# Patient Record
Sex: FEMALE | Race: WHITE | Employment: UNEMPLOYED | URBAN - NONMETROPOLITAN AREA
[De-identification: names, ages, dates, MRNs, and addresses within clinical notes are randomized per-mention and may not be internally consistent; named-entity substitution may affect disease eponyms.]

---

## 2022-10-25 ENCOUNTER — TELEPHONE (OUTPATIENT)
Dept: NEUROLOGY | Age: 52
End: 2022-10-25

## 2022-10-27 ENCOUNTER — TELEPHONE (OUTPATIENT)
Dept: NEUROLOGY | Age: 52
End: 2022-10-27

## 2022-10-27 NOTE — TELEPHONE ENCOUNTER
Received a referral for this patient. Called and spoke with patient to let her know when I was able to get her scheduled an appointment with LUI Caba. Patient is aware of the appointment time/date/location. I will also mailed patient an appointment letter.

## 2022-11-01 ENCOUNTER — TELEPHONE (OUTPATIENT)
Dept: CARDIOLOGY CLINIC | Age: 52
End: 2022-11-01

## 2022-11-01 RX ORDER — ONDANSETRON 4 MG/1
4 TABLET, FILM COATED ORAL EVERY 8 HOURS PRN
COMMUNITY

## 2022-11-01 RX ORDER — LOSARTAN POTASSIUM 100 MG/1
100 TABLET ORAL DAILY
COMMUNITY

## 2022-11-01 RX ORDER — PROPRANOLOL HYDROCHLORIDE 20 MG/1
20 TABLET ORAL 2 TIMES DAILY PRN
COMMUNITY

## 2022-11-02 ENCOUNTER — OFFICE VISIT (OUTPATIENT)
Dept: CARDIOLOGY CLINIC | Age: 52
End: 2022-11-02
Payer: MEDICAID

## 2022-11-02 VITALS
HEART RATE: 53 BPM | HEIGHT: 67 IN | DIASTOLIC BLOOD PRESSURE: 78 MMHG | SYSTOLIC BLOOD PRESSURE: 132 MMHG | WEIGHT: 251 LBS | BODY MASS INDEX: 39.39 KG/M2

## 2022-11-02 DIAGNOSIS — I10 PRIMARY HYPERTENSION: ICD-10-CM

## 2022-11-02 DIAGNOSIS — R01.1 HEART MURMUR: Primary | ICD-10-CM

## 2022-11-02 DIAGNOSIS — G43.909 MIGRAINE WITHOUT STATUS MIGRAINOSUS, NOT INTRACTABLE, UNSPECIFIED MIGRAINE TYPE: ICD-10-CM

## 2022-11-02 DIAGNOSIS — R09.89 BILATERAL CAROTID BRUITS: ICD-10-CM

## 2022-11-02 DIAGNOSIS — R73.03 PREDIABETES: ICD-10-CM

## 2022-11-02 DIAGNOSIS — E66.9 OBESITY (BMI 35.0-39.9 WITHOUT COMORBIDITY): ICD-10-CM

## 2022-11-02 PROCEDURE — 3075F SYST BP GE 130 - 139MM HG: CPT | Performed by: CLINICAL NURSE SPECIALIST

## 2022-11-02 PROCEDURE — 99204 OFFICE O/P NEW MOD 45 MIN: CPT | Performed by: CLINICAL NURSE SPECIALIST

## 2022-11-02 PROCEDURE — 3078F DIAST BP <80 MM HG: CPT | Performed by: CLINICAL NURSE SPECIALIST

## 2022-11-02 PROCEDURE — 93000 ELECTROCARDIOGRAM COMPLETE: CPT | Performed by: CLINICAL NURSE SPECIALIST

## 2022-11-02 ASSESSMENT — ENCOUNTER SYMPTOMS
NAUSEA: 0
FACIAL SWELLING: 0
COUGH: 0
SHORTNESS OF BREATH: 0
VOMITING: 0
CHEST TIGHTNESS: 0
EYE REDNESS: 0
ABDOMINAL PAIN: 0
WHEEZING: 0

## 2022-11-02 NOTE — PROGRESS NOTES
[facility], [address] [facility] [address], [address] [address]  Phone: [phone]  Fax: [phone]    OFFICE VISIT:  2022    Edis Martinez - : 1970    Reason For Visit:  Mya Palmer is a 50 y.o. female who is here for New Patient (Pres. [address] [facility]. ) and Heart Murmur       Diagnosis Orders   1. Heart murmur  EKG 12 lead      2. Bilateral carotid bruits  VL DUP CAROTID BILATERAL      3. Primary hypertension        4. Obesity (BMI 35.0-39.9 without comorbidity)        5. Prediabetes        6. Migraine without status migrainosus, not intractable, unspecified migraine type            HPI  Patient has recently relocated here from [place] to live closer to family and is originally from the [place] area. She is here to establish care. Previously seen by a cardiologist in [place] and told she had a heart murmur. We have no records from her previous cardiology office. She reports an echocardiogram was done earlier this year. From patient's description, it sounds like she possibly has a bicuspid valve, but could not remember if this was the aortic valve for which valve was involved. She states she was told that just needed monitoring presently. Patient was hospitalized in [place] last year for severe migraines, hypertension. She developed strokelike symptoms with right-sided weakness and aphasia. She states that stroke was \"not detectable. \"  But, she has persistent right-sided weakness. She has been plagued with daily migraines since this time and now walks with a cane. She will be seeing neurology in a couple of weeks    Other history includes hypertension, smoking, prediabetes. She has never had a stress test to her knowledge. There is no family history of heart disease in first-degree relatives. There is a history of atrial fibrillation and her sister.     She denies any cardiac symptoms such as chest pain, unusual dyspnea, orthopnea, PND, edema. She has some rare, brief palpitations lasting only seconds       LUI Toscano CNP is PCP. Carly Metz has the following history as recorded in [facility]:    Patient Active Problem List    Diagnosis Date Noted    Heart murmur 2022    Bilateral carotid bruits 2022    Primary hypertension 2022    Obesity (BMI 35.0-39.9 without comorbidity) 2022    Prediabetes 2022    Migraine without status migrainosus, not intractable 2022       Past Medical History:   Diagnosis Date    Anxiety     Heart murmur      Past Surgical History:   Procedure Laterality Date     SECTION      ECTOPIC PREGNANCY SURGERY      GALLBLADDER SURGERY       Family History   Problem Relation Age of Onset    Hypertension Mother     Stroke Mother     Diabetes Father     Atrial Fibrillation Sister      Social History     Tobacco Use    Smoking status: Every Day     Packs/day: 1.00     Types: Cigarettes     Passive exposure: Current    Smokeless tobacco: Never   Substance Use Topics    Alcohol use: Yes     Comment: rarely      Current Outpatient Medications   Medication Sig Dispense Refill    ondansetron (ZOFRAN) 4 MG tablet Take 4 mg by mouth every 8 hours as needed for Nausea or Vomiting      sertraline (ZOLOFT) 50 MG tablet Take 50 mg by mouth daily      losartan (COZAAR) 100 MG tablet Take 100 mg by mouth daily      propranolol (INDERAL) 20 MG tablet Take 20 mg by mouth 2 times daily as needed       No current facility-administered medications for this visit. Allergies: Penicillins    Review of Systems  Review of Systems   Constitutional:  Positive for fatigue. Negative for activity change, diaphoresis, fever and unexpected weight change. HENT:  Negative for facial swelling and nosebleeds. Eyes:  Negative for redness and visual disturbance. Respiratory:  Negative for cough, chest tightness, shortness of breath and wheezing.     Cardiovascular: Negative for chest pain, palpitations and leg swelling. Gastrointestinal:  Negative for abdominal pain, nausea and vomiting. Endocrine: Negative for cold intolerance and heat intolerance. Genitourinary:  Negative for dysuria and hematuria. Musculoskeletal:  Negative for arthralgias and myalgias. Skin:  Negative for pallor and rash. Neurological:  Positive for headaches. Negative for dizziness, seizures, syncope, weakness and light-headedness. Right sided weakness   Hematological:  Does not bruise/bleed easily. Psychiatric/Behavioral:  Negative for agitation. The patient is not nervous/anxious. Objective  Vital Signs - /78   Pulse 53   Ht 5' 7\" (1.702 m)   Wt 251 lb (113.9 kg)   BMI 39.31 kg/m²   Physical Exam  Vitals and nursing note reviewed. Constitutional:       General: She is not in acute distress. Appearance: She is well-developed. She is not diaphoretic. HENT:      Head: Normocephalic and atraumatic. Right Ear: Hearing and external ear normal.      Left Ear: Hearing and external ear normal.      Nose: Nose normal.   Eyes:      General:         Right eye: No discharge. Left eye: No discharge. Pupils: Pupils are equal, round, and reactive to light. Neck:      Thyroid: No thyromegaly. Vascular: Carotid bruit (bilateral) present. No JVD. Trachea: No tracheal deviation. Cardiovascular:      Rate and Rhythm: Normal rate and regular rhythm. Heart sounds: Murmur (2/6 systolic aortic area, radiates to carotids) heard. No friction rub. No gallop. Pulmonary:      Effort: Pulmonary effort is normal. No respiratory distress. Breath sounds: Normal breath sounds. No wheezing or rales. Abdominal:      Palpations: Abdomen is soft. Tenderness: There is no abdominal tenderness. Musculoskeletal:         General: No swelling or deformity. Cervical back: Neck supple. No muscular tenderness. Right lower leg: No edema. Left lower leg: No edema. Comments: Ambulates with cane   Skin:     General: Skin is warm and dry. Findings: No rash. Neurological:      General: No focal deficit present. Mental Status: She is alert and oriented to person, place, and time. Cranial Nerves: No cranial nerve deficit. Psychiatric:         Mood and Affect: Mood normal.         Behavior: Behavior normal.         Judgment: Judgment normal.       Data:    Assessment:     Diagnosis Orders   1. Heart murmur  EKG 12 lead      2. Bilateral carotid bruits  VL DUP CAROTID BILATERAL      3. Primary hypertension        4. Obesity (BMI 35.0-39.9 without comorbidity)        5. Prediabetes        6. Migraine without status migrainosus, not intractable, unspecified migraine type          Heart murmur-with work-up in [place].  Will request records as an echo has been done earlier this year at that location. Patient does not remember specific things, but does remember discussion about a bicuspid valve that needed ongoing monitoring. Bilateral carotid bruits-we will check carotid ultrasound    Hypertension-previously uncontrolled, but stable today after recent increase in losartan    Obesity-encouraged weight loss and regular exercise    Prediabetes-presently stable and has not required diabetes medications thus far    Migraine-ongoing on a daily basis. Seeing neurology in the near future    Stable cardiovascular status. No evidence of overt heart failure,angina or dysrhythmia. Plan    Orders Placed This Encounter   Procedures    VL DUP CAROTID BILATERAL     Standing Status:   Future     Standing Expiration Date:   11/2/2023     Order Specific Question:   Reason for exam:     Answer:   carotid bruit    EKG 12 lead     Order Specific Question:   Reason for Exam?     Answer: Other     Return in about 1 month (around 12/2/2022) for Dr Dora Barraza. Quit smoking.   Call the [place] [facility] [place] records from [facility] [place], [facility] [address]. (Echo)  Carotid Ultrasound    Call with any questionsor concerns  Follow up with Ulysses Palau, APRN - CNP for non cardiac problems  Report any new problems  Cardiovascular Fitness-Exercise as tolerated. Strive for 15 minutes of exercise most days of the week. Cardiac / HealthyDiet  Continue current medications as directed  Continue plan of treatment  It is always recommended that you bring your medicationsbottles with you to each visit - this is for your safety!        LUI Munroe

## 2022-11-02 NOTE — PATIENT INSTRUCTIONS
Return in about 1 month (around 12/2/2022) for Dr Mike Hitchcock. Quit smoking. Call the [address] [place] [address] [phone]   Get records from [place]  Carotid Ultrasound    [place] at the [facility] located on the first floor of [place] [address] through hospital main entrance and turn immediately to your left. Patient's contact number:  [phone] (home)     Date/Time:     Carotid Ultrasound   -  No prep. Takes approximately 30 minutes. Carotid ultrasound is a safe, painless procedure that uses sound waves to examine the structure and function of the carotid arteries in the neck. You have two carotid arteries, one on each side of the neck, which deliver blood from the heart to the brain. Carotid ultrasound can reveal whether an artery has any blockage and how well blood flows through the artery. Carotid ultrasound is usually used to screen for blockages that indicate an increased risk of stroke. Results from a carotid ultrasound can help your doctor determine what kind of treatment you may need to lower your risk. If you need to change your appointment, please call outpatient scheduling at [phone].

## 2022-11-03 ENCOUNTER — TELEPHONE (OUTPATIENT)
Dept: CARDIOLOGY CLINIC | Age: 52
End: 2022-11-03

## 2022-11-03 NOTE — TELEPHONE ENCOUNTER
Reviewed patient records from [place].  Echo shows questionable bicuspid aortic valve with mild stenosis and moderate regurgitation done in February 2022. Carotid studies have already been done so therefore we will cancel carotid studies ordered for bilateral rotted bruits on exam.  She had mild plaquing previously.     Further recommendations per Dr. Yakov Richter at upcoming visit    Penny-please cancel patient's carotid studies as they have already been done

## 2022-11-16 ENCOUNTER — OFFICE VISIT (OUTPATIENT)
Dept: NEUROLOGY | Age: 52
End: 2022-11-16
Payer: MEDICAID

## 2022-11-16 VITALS
OXYGEN SATURATION: 97 % | DIASTOLIC BLOOD PRESSURE: 80 MMHG | SYSTOLIC BLOOD PRESSURE: 141 MMHG | HEART RATE: 61 BPM | BODY MASS INDEX: 39.34 KG/M2 | WEIGHT: 251.2 LBS

## 2022-11-16 DIAGNOSIS — R29.2 HOFFMAN SIGN PRESENT: ICD-10-CM

## 2022-11-16 DIAGNOSIS — R53.1 RIGHT SIDED WEAKNESS: ICD-10-CM

## 2022-11-16 DIAGNOSIS — R40.4 ALTERED CONSCIOUSNESS: ICD-10-CM

## 2022-11-16 DIAGNOSIS — G43.119 INTRACTABLE MIGRAINE WITH AURA WITHOUT STATUS MIGRAINOSUS: Primary | ICD-10-CM

## 2022-11-16 PROCEDURE — 99214 OFFICE O/P EST MOD 30 MIN: CPT | Performed by: NURSE PRACTITIONER

## 2022-11-16 PROCEDURE — 3075F SYST BP GE 130 - 139MM HG: CPT | Performed by: NURSE PRACTITIONER

## 2022-11-16 PROCEDURE — 3078F DIAST BP <80 MM HG: CPT | Performed by: NURSE PRACTITIONER

## 2022-11-16 RX ORDER — UBROGEPANT 100 MG/1
TABLET ORAL
Qty: 10 TABLET | Refills: 3 | Status: SHIPPED | OUTPATIENT
Start: 2022-11-16

## 2022-11-16 RX ORDER — SERTRALINE HYDROCHLORIDE 100 MG/1
TABLET, FILM COATED ORAL
COMMUNITY
Start: 2022-11-15

## 2022-11-16 RX ORDER — TOPIRAMATE 50 MG/1
50 TABLET, FILM COATED ORAL 2 TIMES DAILY
Qty: 60 TABLET | Refills: 3 | Status: SHIPPED | OUTPATIENT
Start: 2022-11-16

## 2022-11-16 NOTE — PROGRESS NOTES
[facility] NEUROLOGY:    Patient: Tang Wise   :  1970  Age:  50 y.o. MRN:  [id number]  Today:  22    Provider: LUI Almonte    Chief Complaint:  Chief Complaint   Patient presents with    New Patient     headaches         HISTORY OF PRESENT ILLNESS:   Tang Wise is a 50y.o. year old female who was referred for headaches and possible past CVA. She just moved here from [place]. She relates she had a possible stroke back in 2021 with right-sided weakness and aphasia. Unclear if CVA was found on any imaging. She was hospitalized at [facility] in [place]. Since then she has had ongoing weakness to right side. She also has issues with impaired speech and trouble with ambulating. Walks with a cane. There is some aphasia with emotional upset, this is intermittent. Has depression and anxiety that are not well-controlled. No SI/HI. She has had mild headaches prior to this event but notes that they are now constant and more severe. Pain is located to the occiput and is described as stabbing/piercing straight up. There is not radiation of pain. There are about daily mild headaches, migrainous headache days occur every day in a week as well. Headaches do not wake her from sleep. There is associated photophobia, phonophobia, nausea, vomiting. Denies diplopia, arturo visual loss, jaw claudication. There is sometimes visual aura. Denies lacrimation, conjunctival injection, mental status changes, or ptosis. Headaches are not aggravated by position changes. Headaches are aborted by nothing. They are triggered by stress. She has been on Propranolol in the past for prevention. They do not take a daily analgesic. Uses Naproxen sparingly. Has tried Nurtec ODT without benefit. Propranolol has helped decrease intensity but not frequency. She is currently on 20 mg BID, heart rate of 61 in office. She relates she was previously on 80 mg daily in the morning.  She is wearing eye mask to right eye, states she has been told she has a partial retinal artery occlusion that was found about a week ago per Dr. Crystal Valladares in [place], [place]. She is scheduled for surgery for this. She has photophobia to right eye with binocular vision. No family history of brain aneurysms. There is family history of migraine disease. No history of trauma to head or neck, meningitis, or encephalitis. PAST MEDICAL HISTORY:    Medical History:      Diagnosis Date    Anxiety     Heart murmur     Retinal artery occlusion     right eye       Surgical History:      Procedure Laterality Date     SECTION      ECTOPIC PREGNANCY SURGERY      GALLBLADDER SURGERY         Current Medications:  Current Outpatient Medications   Medication Sig Dispense Refill    sertraline (ZOLOFT) 100 MG tablet       topiramate (TOPAMAX) 50 MG tablet Take 1 tablet by mouth 2 times daily 60 tablet 3    Ubrogepant (UBRELVY) 100 MG TABS Take 1 tablet at the onset of migraine. May repeat once in 2 hours if no improvement. Do not exceed 2 tablets in 24 hours. 10 tablet 3    ondansetron (ZOFRAN) 4 MG tablet Take 4 mg by mouth every 8 hours as needed for Nausea or Vomiting      losartan (COZAAR) 100 MG tablet Take 100 mg by mouth daily      propranolol (INDERAL) 20 MG tablet Take 20 mg by mouth 2 times daily as needed       No current facility-administered medications for this visit.        Allergies:  Penicillins    SOCIAL HISTORY:   Social History     Socioeconomic History    Marital status: Single     Spouse name: Not on file    Number of children: Not on file    Years of education: Not on file    Highest education level: Not on file   Occupational History    Not on file   Tobacco Use    Smoking status: Every Day     Packs/day: 1.00     Types: Cigarettes     Passive exposure: Current    Smokeless tobacco: Never   Vaping Use    Vaping Use: Former   Substance and Sexual Activity    Alcohol use: Yes     Comment: rarely Drug use: Never    Sexual activity: Not on file   Other Topics Concern    Not on file   Social History Narrative    Not on file     Social Determinants of Health     Financial Resource Strain: Not on file   Food Insecurity: Not on file   Transportation Needs: Not on file   Physical Activity: Not on file   Stress: Not on file   Social Connections: Not on file   Intimate Partner Violence: Not on file   Housing Stability: Not on file       FAMILY HISTORY:       Problem Relation Age of Onset    Hypertension Mother     Stroke Mother     Diabetes Father     Atrial Fibrillation Sister        REVIEW OF SYSTEMS:  Constitutional: []Fever []Sweat []Chills [] Recent Injury [x] Denies all unless marked  HEENT:[x]Headache  [] Head Injury/Hearing Loss  [] Sore Throat  [] Ear Ache/Dizziness  [x] Denies all unless marked  Spine:  [] Neck pain  [] Back pain  [] Sciaticia  [x] Denies all unless marked  Cardiovascular:[]Heart Disease []Chest Pain [] Palpitations  [x] Denies all unless marked  Pulmonary: []Shortness of Breath []Cough   [x] Denies all unless marke  Gastrointestinal: []Nausea  []Vomiting  []Abdominal Pain  []Constipation  []Diarrhea  []Dark Bloody Stools  [x] Denies all unless marked  Psychiatric/Behavioral:[] Depression [] Anxiety [x] Denies all unless marked  Genitourinary:   [] Frequency  [] Urgency  [] Incontinence [] Pain with Urination  [x] Denies all unless marked  Extremities: []Pain  []Swelling  [x] Denies all unless marked  Musculoskeletal: [] Muscle Pain  [] Joint Pain  [] Arthritis [] Muscle Cramps [] Muscle Twitches  [x] Denies all unless marked  Sleep: [] Insomnia [] Snoring [] Restless Legs [] Sleep Apnea  [] Daytime Sleepiness  [x] Denies all unless marked  Skin:[] Rash [] Skin Discoloration [x] Denies all unless marked   Neurological: []Visual Disturbance/Memory Loss [] Loss of Balance [] Slurred Speech/Weakness [] Seizures  [] Vertigo/Dizziness [x] Denies all unless marked    The MA has completed the ROS with the patient. I have reviewed it in its' entirety with the patient and agree with the documentation. PHYSICAL EXAMINATION:  Vitals: BP (!) 141/80   Pulse 61   Wt 251 lb 3.2 oz (113.9 kg)   SpO2 97%   BMI 39.34 kg/m²   Constitutional - No acute distress    HEENT- Conjunctiva normal.  No scars, masses, or lesions over external nose or ears, no neck masses noted, no jugular vein distension, no bruit  Cardiac- Regular rate and rhythm  Pulmonary- Clear to auscultation, good expansion, normal effort without use of accessory muscles  Musculoskeletal - No significant wasting of muscles noted, no bony deformities  Extremities - No clubbing, cyanosis or edema  Skin - Warm, dry, and intact. No rash, erythema, or pallor  Psychiatric - Mood, affect, and behavior appear normal        NEUROLOGIC EXAMINATION:    Mental status   [x]Awake, alert, oriented   [x]Affect attention and concentration appear appropriate  [x]Recent and remote memory appears unremarkable  [x]Speech normal without dysarthria or aphasia, comprehension and repetition intact. COMMENTS:    Cranial Nerves []No VF deficit to confrontation  []PERRLA, EOMI, no nystagmus, conjugate eye movements, no ptosis  []Face symmetric  [x]Facial sensation intact  [x]Tongue midline no atrophy or fasciculations present  [x]Palate midline, hearing to finger rub normal bilaterally  [x]Shoulder shrug and SCM testing normal bilaterally  COMMENTS:mild asymmetry to right oral commissure. Right eye patch on during exam.    Motor   []5/5 strength x 4 extremities  [x]Normal bulk and tone  [x]No tremor present  [x]No rigidity or bradykinesia noted  COMMENTS:weakness to RUE and RLE, decreased  strength to right, mild spasticity to right foot   Sensory  []Sensation intact to light touch, pin prick, vibration, and proprioception BLE  []Sensation intact to light touch, pin prick, vibration, and proprioception BUE  COMMENTS:patchy decreased PP to bilateral palms.  Decreased PP RLE>LLE   Coordination [x]FTN normal bilaterally   []HTS normal bilaterally  []JUAN normal bilaterally. COMMENTS:decreased JUAN right   Reflexes  []Symmetric and non-pathological  []Toes down going bilaterally  [x]No clonus present  COMMENTS:hyperreflexia R +hoffmans R, +babinski R   Gait                  []Normal steady gait    []Ataxic    []Spastic     []Magnetic     []Shuffling  COMMENTS:cautious, antalgic, using cane       ADDITIONAL REVIEW:  Referral records    IMPRESSION:  Juanis Hernandez is a 50 y.o. female here today for headaches and right sided-weakness, aphasia, possible past CVA. She is new to the area and needing to establish Neurology care. Unfortunately I do not have records for review today. She had possible CVA august 2021 with right side weakness and aphasia. Was hospitalized and underwent extensive diagnostics. She believes she had CTA head/neck, Echocardiogram, and MRI of brain. Unsure of all the results, unsure if she has had a heart monitor. She has also established with cardiology locally as well. She has upcoming US carotids bilateral and EKG. Exam today revealed neurological abnormalities to right side including hyperreflexia, weakness, decreased JUAN. Will further evaluate with MRI brain W WO and MRI cervical spine W WO. She also feels she has periods of confusion or altered consciousness, no GTC activity or family history of epilepsy. Will order EEG. As far as headaches go they do sound migrainous in nature, she has had these for years with visual aura although they have worsened since hospitalization. Given possible stroke history, migraines with aura, and possible atypical migraine features I do not feel Propranolol is appropriate for patient from preventative standpoint. Will wean off this and start on Topamax. As far as abortive goes, Triptans are contraindicated. She has tried and failed Nurtec as it provided no benefit. Will trial Ublrevy 100 mg. ICD-10-CM    1. Intractable migraine with aura without status migrainosus  G43. 119 MRI BRAIN W WO CONTRAST     topiramate (TOPAMAX) 50 MG tablet     Ubrogepant (UBRELVY) 100 MG TABS      2. Right sided weakness  R53.1 MRI BRAIN W WO CONTRAST     MRI CERVICAL SPINE W WO CONTRAST      3. Altered consciousness  R40.4 EEG awake and asleep      4. Linares sign present  R29.2 MRI CERVICAL SPINE W WO CONTRAST            PLAN:  Topamax titration. Side effects discussed. 2. Lenell Halter- PRN, side effects discussed. Samples given. 3. Wean off Propranolol as discussed. 4. Daily baby ASA. Maximize stroke risk factors through PCP- BP, glucose, cholesterol control. 5. MRI brain W WO.   6. MRI cervical spine- W WO.   7. EEG brain. 8. Records needed from [facility] in [place]  9. Consider further testing based off of results and review of records. 10. Return in about 2 months (around 1/16/2023) for Follow up, sooner with worsening symptoms.      Rajesh Acevedo, APRN - CNP

## 2022-11-16 NOTE — PATIENT INSTRUCTIONS
Topamax  Start one pill nightly for 5 days, then go to one pill in the morning and one pill in the evening.    Ubrelvy as needed- samples given

## 2022-11-18 ENCOUNTER — CLINICAL DOCUMENTATION (OUTPATIENT)
Dept: NEUROLOGY | Age: 52
End: 2022-11-18

## 2022-11-18 NOTE — PROGRESS NOTES
[place]: [id number] - Rx #: [id number] help? Call us at [phone]  Outcome  Additional Information Required  A previously denied or partially denied PA request has been located for this member. To initiate an appeal or reconsideration please call [phone]. Thank you.   Drug  Ubrelvy 100MG tablets  Form  [facility] ePA Form 2017 NCPDP  Original Claim Info  37

## 2023-01-03 ENCOUNTER — TELEPHONE (OUTPATIENT)
Dept: NEUROLOGY | Age: 53
End: 2023-01-03

## 2023-01-03 NOTE — TELEPHONE ENCOUNTER
Eboni from [facility] department called stating that MRIs were both denied.  Peer to peer requested at phone number [phone] CASE # [id number] Scheduled testing is on 1/6/2022

## 2023-01-05 NOTE — TELEPHONE ENCOUNTER
Called patient and explained that originally both the MRI of the c-spine and Brain were denied by her insurance but we were able to get the MRI c-spine approved at this time so she would need to reschedule this. Patient states that she has had an MRI of Head in [facility]. States that she can stop by the office on 1/6/23 to sign a release of medical information so we can obtain these records.

## 2023-01-06 ENCOUNTER — APPOINTMENT (OUTPATIENT)
Dept: MRI IMAGING | Age: 53
End: 2023-01-06
Payer: MEDICAID

## 2023-01-06 ENCOUNTER — HOSPITAL ENCOUNTER (OUTPATIENT)
Dept: NEUROLOGY | Age: 53
Discharge: HOME | End: 2023-01-06
Payer: MEDICAID

## 2023-01-06 DIAGNOSIS — R40.4 ALTERED CONSCIOUSNESS: ICD-10-CM

## 2023-01-06 PROCEDURE — 95813 EEG EXTND MNTR 61-119 MIN: CPT

## 2023-01-06 NOTE — PROCEDURES
ADULT OUTPATIENT ELECTROENCEPHALOGRAM REPORT    Patient:   Traci Lopez  MR#:    [id number]  YOB: 1970  Date of Evaluation:  1/6/2023  Primary Physician:     LIU Ruggiero - CNP   Referring Physician:   LUI Frank      CLINICAL INFORMATION:     This patient is a 50 y.o. female with a history of possible KOLBY, confusion. MEDICATIONS:     See MAR. RECORDING CONDITIONS:     This EEG was performed utilizing standard International 10-20 System of electrode placement, with additional channels monitored for eye movement. One channel electrocardiogram was monitored. Data was obtained, stored, and interpreted according to ACNS guidelines (J Clin Neurophysiol 2006;23(2):) utilizing referential montage recording, with reformatting to longitudinal, transverse bipolar, and referential montages as necessary for interpretation, along with the digital/automated EEG analysis. Patient tolerated entire procedure well. Photic stimulation and hyperventilation were utilized as activation procedures unless otherwise specified below. Recording time was 63 minutes. E.E.G. DESCRIPTION:     The resting predominant posterior background frequency is a 9-10 Hz 30-40 uV rhythm. No overt focal, lateralizing, or paroxysmal abnormalities were noted through the recording. Drowsiness was demonstrated by slow rolling eye movements followed by a loss of the background waking activities. Onset of stage I sleep was demonstrated by gradual disappearance of background waking rhythms with gradual symmetric mixed frequency 4-7 Hz slowing. Stage II sleep was characterized by vertex transients, sleep-spindles, and K-complexes, at times with shifting asymmetry demonstrated. Hyperventilation was not performed. Photic stimulation was performed and had little change on the recording. Muscle, motion, and eye movement artifacts were noted.        EEG INTERPRETATION:    Normal EEG for age in the awake, drowsy, and sleep states. CLINICAL CORRELATION:     The absence of epileptiform abnormalities does not preclude a clinical diagnosis of seizures.        Cheyenne Womack DO  Board Certified Neurologist    Date reported: 1/6/2023  Date signed: 1/6/2023

## 2023-01-13 ENCOUNTER — TELEPHONE (OUTPATIENT)
Dept: NEUROLOGY | Age: 53
End: 2023-01-13

## 2023-01-13 NOTE — TELEPHONE ENCOUNTER
Attempted to contact patient regarding her MRI orders. No answer at this time and unable to leave a voicemail due to VM box not set up.

## 2023-01-13 NOTE — TELEPHONE ENCOUNTER
Called and spoke woth patient regarding MRI she states that she has not got these done yet and will call scheduling to get these scheduled. Patient was provided with number to scheduling.

## 2023-01-17 ENCOUNTER — HOSPITAL ENCOUNTER (OUTPATIENT)
Dept: MRI IMAGING | Age: 53
Discharge: HOME | End: 2023-01-17
Payer: MEDICAID

## 2023-01-17 DIAGNOSIS — R53.1 RIGHT SIDED WEAKNESS: ICD-10-CM

## 2023-01-17 DIAGNOSIS — R29.2 HOFFMAN SIGN PRESENT: ICD-10-CM

## 2023-01-17 DIAGNOSIS — G43.119 INTRACTABLE MIGRAINE WITH AURA WITHOUT STATUS MIGRAINOSUS: ICD-10-CM

## 2023-01-17 PROCEDURE — 70553 MRI BRAIN STEM W/O & W/DYE: CPT | Performed by: RADIOLOGY

## 2023-01-17 PROCEDURE — A9585 GADOBUTROL INJECTION: HCPCS | Performed by: NURSE PRACTITIONER

## 2023-01-17 PROCEDURE — 72156 MRI NECK SPINE W/O & W/DYE: CPT

## 2023-01-17 PROCEDURE — 70553 MRI BRAIN STEM W/O & W/DYE: CPT

## 2023-01-17 PROCEDURE — 72156 MRI NECK SPINE W/O & W/DYE: CPT | Performed by: RADIOLOGY

## 2023-01-17 PROCEDURE — 6360000004 HC RX CONTRAST MEDICATION: Performed by: NURSE PRACTITIONER

## 2023-01-17 RX ADMIN — GADOBUTROL 1.5 ML: 604.72 INJECTION INTRAVENOUS at 14:49

## 2023-01-17 RX ADMIN — GADOBUTROL 10 ML: 604.72 INJECTION INTRAVENOUS at 14:49

## 2023-01-18 ENCOUNTER — OFFICE VISIT (OUTPATIENT)
Dept: NEUROLOGY | Age: 53
End: 2023-01-18
Payer: MEDICAID

## 2023-01-18 ENCOUNTER — HOSPITAL ENCOUNTER (OUTPATIENT)
Dept: NON INVASIVE DIAGNOSTICS | Age: 53
Discharge: HOME | End: 2023-01-18
Payer: MEDICAID

## 2023-01-18 VITALS
BODY MASS INDEX: 39.39 KG/M2 | HEIGHT: 67 IN | WEIGHT: 251 LBS | HEART RATE: 59 BPM | DIASTOLIC BLOOD PRESSURE: 77 MMHG | SYSTOLIC BLOOD PRESSURE: 140 MMHG | OXYGEN SATURATION: 98 %

## 2023-01-18 DIAGNOSIS — G43.119 INTRACTABLE MIGRAINE WITH AURA WITHOUT STATUS MIGRAINOSUS: Primary | ICD-10-CM

## 2023-01-18 DIAGNOSIS — I63.9 LEFT PONTINE CVA (HCC): ICD-10-CM

## 2023-01-18 DIAGNOSIS — M48.02 FORAMINAL STENOSIS OF CERVICAL REGION: ICD-10-CM

## 2023-01-18 DIAGNOSIS — Z79.899 MEDICATION MANAGEMENT: ICD-10-CM

## 2023-01-18 PROCEDURE — 99214 OFFICE O/P EST MOD 30 MIN: CPT | Performed by: NURSE PRACTITIONER

## 2023-01-18 PROCEDURE — 3074F SYST BP LT 130 MM HG: CPT | Performed by: NURSE PRACTITIONER

## 2023-01-18 PROCEDURE — 80305 DRUG TEST PRSMV DIR OPT OBS: CPT | Performed by: NURSE PRACTITIONER

## 2023-01-18 PROCEDURE — 93246 EXT ECG>7D<15D RECORDING: CPT

## 2023-01-18 PROCEDURE — 3078F DIAST BP <80 MM HG: CPT | Performed by: NURSE PRACTITIONER

## 2023-01-18 RX ORDER — BUTALBITAL, ACETAMINOPHEN AND CAFFEINE 50; 325; 40 MG/1; MG/1; MG/1
TABLET ORAL
Qty: 30 TABLET | Refills: 0 | Status: SHIPPED | OUTPATIENT
Start: 2023-01-18

## 2023-01-18 RX ORDER — CARIPRAZINE 1.5 MG/1
CAPSULE, GELATIN COATED ORAL
COMMUNITY
Start: 2023-01-09

## 2023-01-18 RX ORDER — FLUTICASONE PROPIONATE 50 MCG
SPRAY, SUSPENSION (ML) NASAL
COMMUNITY
Start: 2023-01-09

## 2023-01-18 RX ORDER — NORTRIPTYLINE HYDROCHLORIDE 25 MG/1
25 CAPSULE ORAL NIGHTLY
Qty: 30 CAPSULE | Refills: 3 | Status: SHIPPED | OUTPATIENT
Start: 2023-01-18

## 2023-01-18 NOTE — PROGRESS NOTES
REVIEW OF SYSTEMS    Constitutional: []Fever []Sweat []Chills [] Recent Injury [x] Denies all unless marked  HEENT:[x]Headache  [] Head Injury/Hearing Loss  [] Sore Throat  [] Ear Ache/Dizziness  [x] Denies all unless marked  Spine:  [x] Neck pain  [x] Back pain  [] Sciaticia  [x] Denies all unless marked  Cardiovascular:[]Heart Disease []Chest Pain [] Palpitations  [x] Denies all unless marked  Pulmonary: []Shortness of Breath []Cough   [x] Denies all unless marke  Gastrointestinal: []Nausea  []Vomiting  []Abdominal Pain  []Constipation  []Diarrhea  []Dark Bloody Stools  [x] Denies all unless marked  Psychiatric/Behavioral:[] Depression [] Anxiety [x] Denies all unless marked  Genitourinary:   [] Frequency  [] Urgency  [] Incontinence [] Pain with Urination  [x] Denies all unless marked  Extremities: []Pain  [x]Swelling  [x] Denies all unless marked  Musculoskeletal: [] Muscle Pain  [] Joint Pain  [x] Arthritis [] Muscle Cramps [] Muscle Twitches  [x] Denies all unless marked  Sleep: [x] Insomnia [] Snoring [] Restless Legs [] Sleep Apnea  [] Daytime Sleepiness  [x] Denies all unless marked  Skin:[] Rash [] Skin Discoloration [x] Denies all unless marked   Neurological: []Visual Disturbance/Memory Loss [] Loss of Balance [] Slurred Speech/Weakness [] Seizures  [] Vertigo/Dizziness [x] Denies all unless marked

## 2023-01-18 NOTE — PROGRESS NOTES
[facility] NEUROLOGY:    Patient: Milena Retana   :  1970  Age:  50 y.o. MRN:  [id number]  Today:  22    Provider: LUI Palomino    Chief Complaint:  Chief Complaint   Patient presents with    Follow-up     Migraine             HISTORY OF PRESENT ILLNESS:   Milena Retana is a 50y.o. year old female here for follow up of headaches and past CVA. Recent move here form [place], no prior medical records available. She is living here with sister. She is doing well overall. Previous history of CVA in 2021 with right-sided weakness and aphasia. She was hospitalized at [facility] in [place]. Since then she has had ongoing weakness to right side that is about the same. She also has issues with impaired speech and trouble with ambulating. Feels this is about the same, does still have some word finding issues, slurred speech is better. Walks with a cane. Some good days and some bad days, no worsening. There is some aphasia with emotional upset, this is intermittent. Has depression and anxiety that are now well-controlled on Zoloft and Vraylar. No SI/HI. She has had mild headaches prior to this event but notes that they are now constant and more severe. Pain is still located to the occiput and is described as stabbing/piercing straight up. There is not radiation of pain. There are about daily mild headaches, migrainous headache days about every 2 weeks. Headaches do not wake her from sleep. Severity waxes and wanes daily. There is associated photophobia, phonophobia, nausea, vomiting. Denies diplopia, arturo visual loss, jaw claudication. There is sometimes visual aura. Denies lacrimation, conjunctival injection, mental status changes, or ptosis. Headaches are not aggravated by position changes. Headaches are aborted by nothing. They are triggered by stress. She has been on Propranolol in the past for prevention, is on that at present.  She does not take a daily analgesic. Uses Naproxen sparingly. Has tried Nurtec ODT without benefit. Propranolol has helped decrease intensity but not frequency. She is currently on 20 mg BID, heart rate of 61 in office. She relates she was previously on 80 mg daily in the morning. Riana Nair was tried with samples but did not seem to help. Nurtec has also not helped. Started Topamax, could not tolerate side effects, worsened mood. Tramadol also has not helped in the past. She is no longer wearing eye mask to right eye, is getting injections to eye now through Dr. Gentry Martinez in [place], [place]. 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 She still has photophobia to right eye with binocular vision but this is improving. No family history of brain aneurysms. There is family history of migraine disease. No history of trauma to head or neck, meningitis, or encephalitis. She also notes new right sided tingling sensation from shoulder to hand, precipitated by exertion, but occurs at rest as well. This is daily.      PAST MEDICAL HISTORY:    Medical History:      Diagnosis Date    Anxiety     Heart murmur     Retinal artery occlusion     right eye       Surgical History:      Procedure Laterality Date     SECTION      ECTOPIC PREGNANCY SURGERY      GALLBLADDER SURGERY         Current Medications:  Current Outpatient Medications   Medication Sig Dispense Refill    VRAYLAR 1.5 MG capsule TAKE 1 CAPSULE BY MOUTH ONCE DAILY      fluticasone (FLONASE) 50 MCG/ACT nasal spray USE 2 SPRAY(S) IN EACH NOSTRIL ONCE DAILY      nortriptyline (PAMELOR) 25 MG capsule Take 1 capsule by mouth nightly 30 capsule 3    sertraline (ZOLOFT) 100 MG tablet       ondansetron (ZOFRAN) 4 MG tablet Take 4 mg by mouth every 8 hours as needed for Nausea or Vomiting      losartan (COZAAR) 100 MG tablet Take 100 mg by mouth daily      propranolol (INDERAL) 20 MG tablet Take 20 mg by mouth 2 times daily as needed butalbital-acetaminophen-caffeine (FIORICET, ESGIC) -40 MG per tablet Take 1 tablet PRN every 8 hours as needed for migraine. 10 tablets per 30 days. 30 tablet 0     No current facility-administered medications for this visit.        Allergies:  Penicillins    SOCIAL HISTORY:   Social History     Socioeconomic History    Marital status: Single     Spouse name: Not on file    Number of children: Not on file    Years of education: Not on file    Highest education level: Not on file   Occupational History    Not on file   Tobacco Use    Smoking status: Every Day     Packs/day: 1.00     Types: Cigarettes     Passive exposure: Current    Smokeless tobacco: Never   Vaping Use    Vaping Use: Former   Substance and Sexual Activity    Alcohol use: Yes     Comment: rarely    Drug use: Never    Sexual activity: Not on file   Other Topics Concern    Not on file   Social History Narrative    Not on file     Social Determinants of Health     Financial Resource Strain: Not on file   Food Insecurity: Not on file   Transportation Needs: Not on file   Physical Activity: Not on file   Stress: Not on file   Social Connections: Not on file   Intimate Partner Violence: Not on file   Housing Stability: Not on file       FAMILY HISTORY:       Problem Relation Age of Onset    Hypertension Mother     Stroke Mother     Diabetes Father     Atrial Fibrillation Sister        REVIEW OF SYSTEMS:  Constitutional: []Fever []Sweat []Chills [] Recent Injury [x] Denies all unless marked  HEENT:[x]Headache  [] Head Injury/Hearing Loss  [] Sore Throat  [] Ear Ache/Dizziness  [x] Denies all unless marked  Spine:  [x] Neck pain  [x] Back pain  [] Sciaticia  [x] Denies all unless marked  Cardiovascular:[]Heart Disease []Chest Pain [] Palpitations  [x] Denies all unless marked  Pulmonary: []Shortness of Breath []Cough   [x] Denies all unless marke  Gastrointestinal: []Nausea  []Vomiting  []Abdominal Pain  []Constipation  []Diarrhea  []Dark Bloody Stools [x] Denies all unless marked  Psychiatric/Behavioral:[] Depression [] Anxiety [x] Denies all unless marked  Genitourinary:   [] Frequency  [] Urgency  [] Incontinence [] Pain with Urination  [x] Denies all unless marked  Extremities: []Pain  [x]Swelling  [x] Denies all unless marked  Musculoskeletal: [] Muscle Pain  [] Joint Pain  [x] Arthritis [] Muscle Cramps [] Muscle Twitches  [x] Denies all unless marked  Sleep: [x] Insomnia [] Snoring [] Restless Legs [] Sleep Apnea  [] Daytime Sleepiness  [x] Denies all unless marked  Skin:[] Rash [] Skin Discoloration [x] Denies all unless marked   Neurological: []Visual Disturbance/Memory Loss [] Loss of Balance [] Slurred Speech/Weakness [] Seizures  [] Vertigo/Dizziness [x] Denies all unless marked    The MA has completed the ROS with the patient. I have reviewed it in its' entirety with the patient and agree with the documentation. PHYSICAL EXAMINATION:  Vitals: BP (!) 140/77   Pulse 59   Ht 5' 7\" (1.702 m)   Wt 251 lb (113.9 kg)   SpO2 98%   BMI 39.31 kg/m²   Constitutional - No acute distress    HEENT- Conjunctiva normal.  No scars, masses, or lesions over external nose or ears, no neck masses noted, no jugular vein distension, no bruit  Cardiac- Regular rate and rhythm  Pulmonary- Clear to auscultation, good expansion, normal effort without use of accessory muscles  Musculoskeletal - No significant wasting of muscles noted, no bony deformities  Extremities - No clubbing, cyanosis or edema  Skin - Warm, dry, and intact. No rash, erythema, or pallor  Psychiatric - Mood, affect, and behavior appear normal        NEUROLOGIC EXAMINATION:    Mental status   [x]Awake, alert, oriented   [x]Affect attention and concentration appear appropriate  [x]Recent and remote memory appears unremarkable  [x]Speech normal without dysarthria or aphasia, comprehension and repetition intact.    COMMENTS:    Cranial Nerves [x]No VF deficit to confrontation  [x]PERRLA, EOMI, no nystagmus, conjugate eye movements, no ptosis  [x]Face symmetric  [x]Facial sensation intact  [x]Tongue midline no atrophy or fasciculations present  [x]Palate midline, hearing to finger rub normal bilaterally  [x]Shoulder shrug and SCM testing normal bilaterally  COMMENTS:    Motor   []5/5 strength x 4 extremities  [x]Normal bulk and tone  [x]No tremor present  [x]No rigidity or bradykinesia noted  COMMENTS:weakness to RUE and RLE, decreased  strength to right, mild spasticity to right foot   Sensory  []Sensation intact to light touch, pin prick, vibration, and proprioception BLE  []Sensation intact to light touch, pin prick, vibration, and proprioception BUE  COMMENTS:patchy decreased PP to bilateral palms. Decreased PP RLE>LLE   Coordination [x]FTN normal bilaterally   []HTS normal bilaterally  []JUAN normal bilaterally. COMMENTS:decreased JUAN right   Reflexes  []Symmetric and non-pathological  []Toes down going bilaterally  []No clonus present  COMMENTS:hyperreflexia R +hoffmans R, +babinski R, 1 beat clonus right foot   Gait                  []Normal steady gait    []Ataxic    []Spastic     []Magnetic     []Shuffling  COMMENTS:cautious, antalgic, using cane       ADDITIONAL REVIEW:  Narrative   Examination: MRI of the brain with and without IV contrast   Clinical history: Intractable migraine with aura   Comparison: None   Technique: Multiplanar multisequence MR images of the brain were performed with   and without IV contrast.   Findings:   [place] focus of signal abnormality is seen within the LEFT aziza (series 5, image   8, series 6, image 8, series 3, image 35). This may represent a remote lacunar   infarct. Several tiny foci of T2/FLAIR signal hyperintensity are seen within the   hemispheric white matter, nonspecific. Midline structures appear unremarkable. No significant mass effect, intracranial hemorrhage, or hydrocephalus is   identified. No abnormal contrast enhancement is identified. Diffusion-weighted   sequences demonstrate no acute infarct. Visualized intracranial flow-voids   appear unremarkable. Scattered paranasal sinus mucosal thickening is noted. The   mastoids appear unremarkable. The orbits, globes, and retrobulbar soft tissues   appear unremarkable. The visualized superficial soft tissues and cervical spine   are without significant abnormality. Impression   Impression:    1.[place] focus of signal abnormality is seen within the LEFT aziza (series 5,       image 8, series 6, image 8, series 3, image 35). This may represent a remote       lacunar infarct. Several tiny foci of T2/FLAIR signal hyperintensity are       seen within the hemispheric white matter, nonspecific. 2.No diffusion restriction is identified to suggest acute infarct. 3.No abnormal contrast enhancement is seen. 4.Scattered paranasal sinus mucosal thickening. Narrative   Exam: MRI cervical spine without and with contrast   History: Right-sided weakness   Comparison: None. Technique: Multiplanar multisequence magnetic resonance imaging of the cervical   spine was performed without and with contrast.11.5 mL of Gadavist.   Findings:   Bone marrow signal is homogeneous without focal suspicious osseous lesion. There   is no acute fracture in the cervical spine. There is 2 mm of retrolisthesis of   C6 on C7. Multilevel degenerative changes are demonstrated, with pertinent level   by level findings as follows: At C2-C3, there is no significant central canal or foraminal narrowing. At C3-C4, there is no significant central canal or foraminal narrowing. At C4-C5, there is mild uncinate spurring and facet disease contributing to mild   bilateral foraminal narrowing. No significant central canal narrowing. At C5-C6, there is posterior disc osteophyte with mild central canal narrowing. Disc and facet disease causes moderate left foraminal narrowing.    At C6-C7 there is posterior disc osteophyte with moderate central canal   narrowing. The disc and facet disease cause severe bilateral foraminal   narrowing. The cervical cord is normal in signal.There is no abnormal enhancement   postcontrast.   No additional soft tissue incidental abnormalities identified. Impression   Cervical spine degenerative changes as detailed above, with focally severe   bilateral foraminal narrowing at C6-C7. IMPRESSION:  Filipe Ordoñez is a 50 y.o. female here today for follow up of headaches and CVA with residual right sided-weakness. She is doing well overall. Mood is improved since last visit, on Vraylar and Zoloft. Right sided weakness largely unchanged. Denies new stroke-like symptoms. Speech has improved. She is using cane for ambulating and doing well, no falls. Headaches unchanged; they are still uncontrolled. Could not tolerate Topamax, Ubrelvy PRN with no benefit. Has tried and failed Nurtec PRN in the past. Triptans contraindicated. She is on Propranolol at present with benefit although with her atypical migraine features and aura this is not ideal medication for her. Discussed dangers of this and she is aware; we will try to find new preventative and stop the Propranolol. Exam today unchanged from prior with neurological abnormalities to right side including hyperreflexia, weakness, decreased JUAN. MRI brain completed showed CVA to left aziza with , no acute pathology. Cervical spine MRI with degenerative changes and focally severe NF bilaterally at C6-C7. She does report new tingling/altered sensation to RUE. There is weakness as well. Hard to know is this is from CVA versus cervical spine findings. Will refer to Neurosurgery for evaluation. EEG completed was WNL. Will start Pamelor for migraine preventative, will also start Fioricet to be used PRN.  I do not have records from prior inpatient admission in August when CVA occurred; she states she did have Echocardiogram that was normal. Has seen cardiology and is scheduled for carotid US bilaterally. She cannot recall holter monitor being completed so we will order ZIO today as well. ICD-10-CM    1. Intractable migraine with aura without status migrainosus  G43.119 nortriptyline (PAMELOR) 25 MG capsule     POCT Rapid Drug Screen      2. Left pontine CVA (HCC)  I63.9 LONGTERM CONTINUOUS CARDIAC EVENT MONITOR (ZIO)      3. Foraminal stenosis of cervical region  M48.02 LUI Valentin, Neurosurgery, [place]      4. Medication management  Z79.899           PLAN:    Daily baby ASA- maximize stroke risk factors through PCP- BP, glucose, cholesterol control. ZIO patch ordered. Neurosurgery referral for cervical spine findings. Pamelor 25 mg nightly; side effects discussed. Plan to wean off Propranolol down the line. Start Physical Therapy order per PCP as discussed. Fioricet PRN-10 per month. - Drug contract and USD completed today. Cardiology tomorrow as scheduled. US carotids as scheduled. PCP Monday as scheduled. 9. Request records again from [facility] in [place].  10. Follow up in 3 months, sooner if needed.      Kaden Marie, APRN - CNP

## 2023-01-18 NOTE — TELEPHONE ENCOUNTER
Requested Prescriptions     Pending Prescriptions Disp Refills    butalbital-acetaminophen-caffeine (FIORICET, ESGIC) -40 MG per tablet 30 tablet 0     Sig: Take 1 tablet PRN every 8 hours as needed for migraine. 10 tablets per 30 days.        Last Office Visit:  1/18/2023  Next Office Visit:  4/19/2023  Last Medication Refill: N/A   Rosy Garcia up to date:  up to date    *RX updated to reflect 1/18/2023  fill date*

## 2023-01-19 ENCOUNTER — TELEPHONE (OUTPATIENT)
Dept: NEUROLOGY | Age: 53
End: 2023-01-19

## 2023-01-19 ENCOUNTER — TELEPHONE (OUTPATIENT)
Dept: NEUROSURGERY | Age: 53
End: 2023-01-19

## 2023-01-19 ENCOUNTER — OFFICE VISIT (OUTPATIENT)
Dept: CARDIOLOGY CLINIC | Age: 53
End: 2023-01-19

## 2023-01-19 ENCOUNTER — CLINICAL DOCUMENTATION (OUTPATIENT)
Dept: NEUROLOGY | Age: 53
End: 2023-01-19

## 2023-01-19 VITALS
BODY MASS INDEX: 42.38 KG/M2 | HEIGHT: 67 IN | WEIGHT: 270 LBS | HEART RATE: 79 BPM | DIASTOLIC BLOOD PRESSURE: 82 MMHG | OXYGEN SATURATION: 97 % | SYSTOLIC BLOOD PRESSURE: 140 MMHG

## 2023-01-19 DIAGNOSIS — Q23.0 AORTIC STENOSIS DUE TO BICUSPID AORTIC VALVE: Primary | ICD-10-CM

## 2023-01-19 DIAGNOSIS — Q23.1 AORTIC STENOSIS DUE TO BICUSPID AORTIC VALVE: Primary | ICD-10-CM

## 2023-01-19 RX ORDER — HYDROCHLOROTHIAZIDE 25 MG/1
25 TABLET ORAL EVERY MORNING
Qty: 90 TABLET | Refills: 3 | Status: SHIPPED | OUTPATIENT
Start: 2023-01-19

## 2023-01-19 NOTE — PROGRESS NOTES
Cardiology Office Visit Note  Abigail Fernandez  [id number]  Phone: [phone]  Fax: [phone]                            Date:  1/19/2023  Patient: Traci Lopez  Age:  50 y.o., 1970    Referral: No ref. provider found    REASON FOR VISIT:  Follow-up (Heart murmur. Patient has no cardiac complaints.)         PROBLEM LIST:    Patient Active Problem List    Diagnosis Date Noted    Heart murmur 11/02/2022     Priority: Medium    Bilateral carotid bruits 11/02/2022     Priority: Medium    Primary hypertension 11/02/2022     Priority: Medium    Obesity (BMI 35.0-39.9 without comorbidity) 11/02/2022     Priority: Medium    Prediabetes 11/02/2022     Priority: Medium    Migraine without status migrainosus, not intractable 11/02/2022     Priority: Medium         PRESENTATION: Traci Lopez is a 50y.o. year old female is seen today for a routine cardiology follow-up exam.  Her past medical history notable for essential hypertension, prediabetes mellitus, migraines and obesity with BMI 42. There is also history of patient developing strokelike symptoms in the remote past.  Most recent MRI brain in November 2022 showed evidence for remote lacunar infarct with no acute changes. MRI cervical spine notable for degenerative disease with focal severe bilateral foraminal narrowing at C6-C7. Bilateral carotid artery ultrasound dated 11/2021 excluded significant stenosis. .  2D echocardiogram with Doppler also in November 2021 notable for normal ejection fraction, bicuspid aortic valve possibly with mild aortic stenosis and moderate aortic regurgitation. She is currently on losartan and propanolol. REVIEW OF SYSTEMS:  Review of Systems   Constitutional:  Negative for chills, fatigue and fever. HENT:  Negative for congestion, facial swelling, hearing loss and sore throat. Eyes:  Negative for pain, discharge, redness and visual disturbance.    Respiratory:  Negative for apnea, cough, chest tightness, shortness of breath and wheezing. Cardiovascular:  Negative for chest pain, palpitations and leg swelling. Gastrointestinal:  Negative for abdominal distention, abdominal pain, blood in stool, constipation, diarrhea, nausea and vomiting. Endocrine: Negative for polydipsia, polyphagia and polyuria. Genitourinary:  Negative for dysuria, flank pain, frequency and hematuria. Musculoskeletal:  Negative for joint swelling, myalgias and neck pain. Skin:  Negative for pallor and rash. Neurological:  Negative for dizziness, syncope, speech difficulty, light-headedness, numbness and headaches. Psychiatric/Behavioral:  Negative for confusion, hallucinations and sleep disturbance. Past Medical History:      Diagnosis Date    Anxiety     Heart murmur     Retinal artery occlusion     right eye       Past Surgical History:      Procedure Laterality Date     SECTION      ECTOPIC PREGNANCY SURGERY      GALLBLADDER SURGERY         Medications:  Current Outpatient Medications   Medication Sig Dispense Refill    VRAYLAR 1.5 MG capsule TAKE 1 CAPSULE BY MOUTH ONCE DAILY      fluticasone (FLONASE) 50 MCG/ACT nasal spray USE 2 SPRAY(S) IN EACH NOSTRIL ONCE DAILY      nortriptyline (PAMELOR) 25 MG capsule Take 1 capsule by mouth nightly 30 capsule 3    butalbital-acetaminophen-caffeine (FIORICET, ESGIC) -40 MG per tablet Take 1 tablet PRN every 8 hours as needed for migraine. 10 tablets per 30 days. 30 tablet 0    sertraline (ZOLOFT) 100 MG tablet       ondansetron (ZOFRAN) 4 MG tablet Take 4 mg by mouth every 8 hours as needed for Nausea or Vomiting      losartan (COZAAR) 100 MG tablet Take 100 mg by mouth daily      propranolol (INDERAL) 20 MG tablet Take 20 mg by mouth 2 times daily as needed       No current facility-administered medications for this visit.        Allergies:  Penicillins    Social History:  Social History     Occupational History    Not on file Tobacco Use    Smoking status: Every Day     Packs/day: 1.00     Types: Cigarettes     Passive exposure: Current    Smokeless tobacco: Never   Vaping Use    Vaping Use: Former   Substance and Sexual Activity    Alcohol use: Yes     Comment: rarely    Drug use: Never    Sexual activity: Not on file         Family History:       Problem Relation Age of Onset    Hypertension Mother     Stroke Mother     Diabetes Father     Atrial Fibrillation Sister          Physical Examination:  BP (!) 140/82   Pulse 79   Ht 5' 7\" (1.702 m)   Wt 270 lb (122.5 kg)   SpO2 97%   BMI 42.29 kg/m²   Physical Exam  Vitals reviewed. Constitutional:       General: She is not in acute distress. Appearance: Normal appearance. She is not ill-appearing, toxic-appearing or diaphoretic. HENT:      Head: Normocephalic and atraumatic. Eyes:      General: No scleral icterus. Right eye: No discharge. Left eye: No discharge. Conjunctiva/sclera: Conjunctivae normal.   Neck:      Vascular: No carotid bruit. Cardiovascular:      Rate and Rhythm: Normal rate and regular rhythm. No extrasystoles are present. Chest Wall: PMI is not displaced. No thrill. Heart sounds: S1 normal and S2 normal. No murmur heard. No friction rub. No gallop. Pulmonary:      Effort: Pulmonary effort is normal. No tachypnea or respiratory distress. Breath sounds: Normal breath sounds. No stridor. No wheezing, rhonchi or rales. Chest:      Chest wall: No tenderness. Abdominal:      General: Bowel sounds are normal. There is no distension. Palpations: Abdomen is soft. There is no mass. Tenderness: There is no abdominal tenderness. There is no guarding. Musculoskeletal:         General: No swelling. Cervical back: Normal range of motion and neck supple. No rigidity. Right lower leg: No edema. Left lower leg: No edema. Skin:     General: Skin is warm and dry. Coloration: Skin is not jaundiced. Findings: No erythema or rash. Neurological:      General: No focal deficit present. Mental Status: She is alert and oriented to person, place, and time. Mental status is at baseline. Psychiatric:         Mood and Affect: Mood normal.         Behavior: Behavior normal.         Thought Content: Thought content normal.         Labs:   CBC: No results found for: CBC   BMP: No results found for: BMP    BNP: No results found for: BNPINT   PT/INR: No results found for: PROTIME, INR, INR    APTT:  No results found for: APTT   CARDIAC ENZYMES: No results found for: CKTOTAL, CKMB, CKMBINDEX, TROPONINI   LIPID PANEL: No results found for: LIPIDPAN  LIVER PROFILE: No results found for: AST, ALT, LABALBU           Imaging:    - EKG : Sinus bradycardia 53 bpm.  No acute changes or ischemia. ASSESSMENT and PLAN:    Valvular heart disease--congenital bicuspid aortic valve, not known to be hemodynamically significant (provider notes in 2021)  Chronic CVA--left aziza lacunar infarct on MRI brain  Essential hypertension, goal blood pressure less than 130/80  Morbid obesity, BMI 42    Surveillance monitoring by 2D echocardiogram with Doppler  Maintain normotension, currently on hydrochlorothiazide and losartan  Will need to lose a considerable amount of weight by way of heart healthy dieting and exercise as tolerated  Continue conservative treatment plan        Electronically signed by Raquel Olsen MD on 1/19/2023 at 1:29 PM    Raquel Olsen MD, LORI, [facility]  Noninvasive Cardiology Consultant    This dictation was generated by voice recognition computer software. Although all attempts are made to edit the dictation for accuracy, there may be errors in the transcription that are not intended.

## 2023-01-19 NOTE — PROGRESS NOTES
Josafat DSOUZA Case ID: [id number] - Rx #: [id number] help?  Call us at [phone]  Status  Sent to [facility]  Drug  Butalbital-APAP-Caffeine -40MG tablets  Form  [facility] [address] [id number]

## 2023-01-23 ASSESSMENT — ENCOUNTER SYMPTOMS
SORE THROAT: 0
CONSTIPATION: 0
EYE REDNESS: 0
DIARRHEA: 0
COUGH: 0
EYE DISCHARGE: 0
FACIAL SWELLING: 0
ABDOMINAL DISTENTION: 0
VOMITING: 0
WHEEZING: 0
NAUSEA: 0
SHORTNESS OF BREATH: 0
CHEST TIGHTNESS: 0
APNEA: 0
EYE PAIN: 0
ABDOMINAL PAIN: 0
BLOOD IN STOOL: 0

## 2023-01-25 ENCOUNTER — HOSPITAL ENCOUNTER (OUTPATIENT)
Dept: NON INVASIVE DIAGNOSTICS | Age: 53
Discharge: HOME | End: 2023-01-25
Payer: MEDICAID

## 2023-01-25 DIAGNOSIS — Q23.0 AORTIC STENOSIS DUE TO BICUSPID AORTIC VALVE: ICD-10-CM

## 2023-01-25 DIAGNOSIS — Q23.1 AORTIC STENOSIS DUE TO BICUSPID AORTIC VALVE: ICD-10-CM

## 2023-01-25 LAB
LV EF: 63 %
LVEF MODALITY: NORMAL

## 2023-01-25 PROCEDURE — 6360000004 HC RX CONTRAST MEDICATION: Performed by: INTERNAL MEDICINE

## 2023-01-25 RX ADMIN — PERFLUTREN 1.5 ML: 6.52 INJECTION, SUSPENSION INTRAVENOUS at 12:12

## 2023-01-25 NOTE — RESULT ENCOUNTER NOTE
Findings stable since November 2021 with mild aortic stenosis and moderate aortic regurgitation. Plan for echocardiogram in 1 year.

## 2023-02-20 ENCOUNTER — TELEPHONE (OUTPATIENT)
Dept: NEUROLOGY | Age: 53
End: 2023-02-20

## 2023-02-20 DIAGNOSIS — R94.31 HOLTER MONITOR, ABNORMAL: Primary | ICD-10-CM

## 2023-02-20 NOTE — TELEPHONE ENCOUNTER
Attempted to reach patient regarding BW result note seen below. No answer at this time. Left voicemail with my name and number to return call.   ----- Message from Reuel Boeck, APRN - CNP sent at 2/20/2023  2:52 PM CST -----  Some abnormalities on ZIO- will refer to cardiology.

## 2023-02-21 ENCOUNTER — TELEPHONE (OUTPATIENT)
Dept: NEUROLOGY | Age: 53
End: 2023-02-21

## 2023-02-21 NOTE — TELEPHONE ENCOUNTER
Called and spoke with patient advised her of BW note seen below. She voiced her understanding and states that she has does have a drs appointment coming up with Dr. Wilamn Valadez as she is already established there. She had no other questions at this time. ----- Message from LUI Martínez CNP sent at 2/20/2023  2:52 PM CST -----  Some abnormalities on ZIO- will refer to cardiology.

## 2023-02-27 ENCOUNTER — SCHEDULED TELEPHONE ENCOUNTER (OUTPATIENT)
Dept: CARDIOLOGY CLINIC | Age: 53
End: 2023-02-27
Payer: MEDICAID

## 2023-02-27 DIAGNOSIS — R94.31 ABNORMAL EKG: Primary | ICD-10-CM

## 2023-02-27 PROCEDURE — 99214 OFFICE O/P EST MOD 30 MIN: CPT | Performed by: INTERNAL MEDICINE

## 2023-02-27 RX ORDER — BUSPIRONE HYDROCHLORIDE 5 MG/1
TABLET ORAL
COMMUNITY
Start: 2023-02-17

## 2023-02-27 RX ORDER — HYDROXYZINE HYDROCHLORIDE 10 MG/1
TABLET, FILM COATED ORAL
COMMUNITY
Start: 2023-02-17

## 2023-02-27 RX ORDER — ATENOLOL 25 MG/1
25 TABLET ORAL DAILY
Qty: 90 TABLET | Refills: 3 | Status: SHIPPED | OUTPATIENT
Start: 2023-02-27 | End: 2023-05-28

## 2023-02-27 ASSESSMENT — ENCOUNTER SYMPTOMS
SHORTNESS OF BREATH: 0
SORE THROAT: 0
FACIAL SWELLING: 0
WHEEZING: 0
CHEST TIGHTNESS: 0
ABDOMINAL DISTENTION: 0
EYE REDNESS: 0
ABDOMINAL PAIN: 0
BLOOD IN STOOL: 0
NAUSEA: 0
APNEA: 0
CONSTIPATION: 0
COUGH: 0
EYE DISCHARGE: 0
DIARRHEA: 0
EYE PAIN: 0
VOMITING: 0

## 2023-02-27 NOTE — PROGRESS NOTES
Cardiology Office Visit Note  [address]res [address] [address]  Phone: [phone]  Fax: [phone]                            Date:  2/27/2023  Patient: Erin Espinoza  Age:  50 y.o., 1970    Referral: LUI Rodriguez *    REASON FOR VISIT:  Follow-up (Aortic stenosis due to bicuspid aortic valve. Patient reports no changes or improvement in cardiac condition. )         PROBLEM LIST:    Patient Active Problem List    Diagnosis Date Noted    Heart murmur 11/02/2022     Priority: Medium    Bilateral carotid bruits 11/02/2022     Priority: Medium    Primary hypertension 11/02/2022     Priority: Medium    Obesity (BMI 35.0-39.9 without comorbidity) 11/02/2022     Priority: Medium    Prediabetes 11/02/2022     Priority: Medium    Migraine without status migrainosus, not intractable 11/02/2022     Priority: Medium         PRESENTATION: Erin Espinoza is a 50y.o. year old female is evaluated today concerning abnormal ZIO monitor and hypertension. She is known to have essential hypertension valvular heart disease. Last ultrasound showed stable mild aortic stenosis and moderate aortic regurgitation. Currently on a combination of losartan and hydrochlorothiazide for blood pressure. Blood pressure at home normal to mildly hypertensive. History of lacunar CVA noted. Reports compliance with antihypertensives. No new or worsening chest pain, shortness of breath, palpitations or diaphoresis. Recent ZIO monitor notable for rare nonsustained SVT and VT. REVIEW OF SYSTEMS:  Review of Systems   Constitutional:  Negative for chills, fatigue and fever. HENT:  Negative for congestion, facial swelling, hearing loss and sore throat. Eyes:  Negative for pain, discharge, redness and visual disturbance. Respiratory:  Negative for apnea, cough, chest tightness, shortness of breath and wheezing. Cardiovascular:  Negative for chest pain, palpitations and leg swelling. Gastrointestinal:  Negative for abdominal distention, abdominal pain, blood in stool, constipation, diarrhea, nausea and vomiting. Endocrine: Negative for polydipsia, polyphagia and polyuria. Genitourinary:  Negative for dysuria, flank pain, frequency and hematuria. Musculoskeletal:  Negative for joint swelling, myalgias and neck pain. Skin:  Negative for pallor and rash. Neurological:  Negative for dizziness, syncope, speech difficulty, light-headedness, numbness and headaches. Psychiatric/Behavioral:  Negative for confusion, hallucinations and sleep disturbance. Past Medical History:      Diagnosis Date    Anxiety     Heart murmur     Retinal artery occlusion     right eye       Past Surgical History:      Procedure Laterality Date     SECTION      ECTOPIC PREGNANCY SURGERY      GALLBLADDER SURGERY         Medications:  Current Outpatient Medications   Medication Sig Dispense Refill    hydrOXYzine HCl (ATARAX) 10 MG tablet       busPIRone (BUSPAR) 5 MG tablet       hydroCHLOROthiazide (HYDRODIURIL) 25 MG tablet Take 1 tablet by mouth every morning 90 tablet 3    VRAYLAR 1.5 MG capsule TAKE 1 CAPSULE BY MOUTH ONCE DAILY      fluticasone (FLONASE) 50 MCG/ACT nasal spray USE 2 SPRAY(S) IN EACH NOSTRIL ONCE DAILY      nortriptyline (PAMELOR) 25 MG capsule Take 1 capsule by mouth nightly 30 capsule 3    butalbital-acetaminophen-caffeine (FIORICET, ESGIC) -40 MG per tablet Take 1 tablet PRN every 8 hours as needed for migraine. 10 tablets per 30 days. 30 tablet 0    sertraline (ZOLOFT) 100 MG tablet       ondansetron (ZOFRAN) 4 MG tablet Take 4 mg by mouth every 8 hours as needed for Nausea or Vomiting      losartan (COZAAR) 100 MG tablet Take 100 mg by mouth daily       No current facility-administered medications for this visit.        Allergies:  Penicillins    Social History:  Social History     Occupational History    Not on file   Tobacco Use    Smoking status: Every Day Packs/day: 1.00     Types: Cigarettes     Passive exposure: Current    Smokeless tobacco: Never   Vaping Use    Vaping Use: Former   Substance and Sexual Activity    Alcohol use: Yes     Comment: rarely    Drug use: Never    Sexual activity: Not on file         Family History:       Problem Relation Age of Onset    Hypertension Mother     Stroke Mother     Diabetes Father     Atrial Fibrillation Sister          Physical Examination:  There were no vitals taken for this visit. Physical Exam  Vitals reviewed. Constitutional:       General: She is not in acute distress. Appearance: Normal appearance. She is not ill-appearing, toxic-appearing or diaphoretic. HENT:      Head: Normocephalic and atraumatic. Eyes:      General: No scleral icterus. Right eye: No discharge. Left eye: No discharge. Conjunctiva/sclera: Conjunctivae normal.   Neck:      Vascular: No carotid bruit. Cardiovascular:      Rate and Rhythm: Normal rate and regular rhythm. No extrasystoles are present. Chest Wall: PMI is not displaced. No thrill. Heart sounds: S1 normal and S2 normal. No murmur heard. No friction rub. No gallop. Pulmonary:      Effort: Pulmonary effort is normal. No tachypnea or respiratory distress. Breath sounds: Normal breath sounds. No stridor. No wheezing, rhonchi or rales. Chest:      Chest wall: No tenderness. Abdominal:      General: Bowel sounds are normal. There is no distension. Palpations: Abdomen is soft. There is no mass. Tenderness: There is no abdominal tenderness. There is no guarding. Musculoskeletal:         General: No swelling. Cervical back: Normal range of motion and neck supple. No rigidity. Right lower leg: No edema. Left lower leg: No edema. Skin:     General: Skin is warm and dry. Coloration: Skin is not jaundiced. Findings: No erythema or rash. Neurological:      General: No focal deficit present.       Mental Status: She is alert and oriented to person, place, and time. Mental status is at baseline. Psychiatric:         Mood and Affect: Mood normal.         Behavior: Behavior normal.         Thought Content: Thought content normal.      Echocardiogram 1/25/2023  Summary  Normal left ventricular size with preserved LV function and an estimated ejection fraction of approximately 60-65%. No regional wall motion abnormalities. Mild concentric left ventricular hypertrophy noted. Diastolic dysfunction is indeterminant. Normal right ventricular size with preserved RV function (TAPSE 24 mm). Normal bi-atrial size. Mild aortic stenosis; peak velocity 3 m/s, peak gradient 36 mmHg, mean gradient 20 mmHg. Moderate aortic regurgitation. Mild tricuspid regurgitation with estimated RVSP of 25 mmHg. The visualized aorta is within normal limits. No evidence of significant pericardial effusion is noted. The rhythm is sinus. No previous exams. Signature  Electronically signed by Krys Donis(Interpreting physician) on 01/25/2023 01:09 PM        ASSESSMENT and PLAN:    Valvular heart disease  Congenital bicuspid aortic valve  Mild aortic stenosis and moderate aortic regurgitation  Plan for repeat echocardiogram in 1 year from last study  Maintain normotension    Essential hypertension, suboptimally controlled on home monitor  Chronic cerebrovascular accident-left aziza lacunar infarct on MRI brain, 1/2022  Goal blood pressure less than 120/80  Continue losartan and hydrochlorothiazide  Add atenolol 25 mg p.o. daily  Home blood pressure monitoring  Low-sodium diet, less than 2 g per 24 hours    Abnormal cardiac monitor, asymptomatic  Rare nonsustained VT (longest 11 beats) and rare PSVT  Check electrolytes including potassium and magnesium. Check TSH.   Add low-dose atenolol 25 mg p.o. daily  Treadmill stress test for further risk stratification, exclude inducible ischemia                Electronically signed by Beatrice Grove MD on 2/27/2023 at 10:20 AM    Jus Lund MD, LORI, [facility] [place]  Noninvasive Cardiology Consultant    This dictation was generated by voice recognition computer software. Although all attempts are made to edit the dictation for accuracy, there may be errors in the transcription that are not intended.

## 2023-04-19 ENCOUNTER — OFFICE VISIT (OUTPATIENT)
Dept: NEUROLOGY | Age: 53
End: 2023-04-19
Payer: MEDICAID

## 2023-04-19 VITALS
SYSTOLIC BLOOD PRESSURE: 123 MMHG | DIASTOLIC BLOOD PRESSURE: 73 MMHG | HEIGHT: 67 IN | HEART RATE: 68 BPM | BODY MASS INDEX: 42.38 KG/M2 | WEIGHT: 270 LBS | OXYGEN SATURATION: 100 %

## 2023-04-19 DIAGNOSIS — R32 URINARY INCONTINENCE, UNSPECIFIED TYPE: ICD-10-CM

## 2023-04-19 DIAGNOSIS — M48.02 FORAMINAL STENOSIS OF CERVICAL REGION: ICD-10-CM

## 2023-04-19 DIAGNOSIS — I63.9 LEFT PONTINE CVA (HCC): Primary | ICD-10-CM

## 2023-04-19 DIAGNOSIS — G43.109 MIGRAINE WITH AURA AND WITHOUT STATUS MIGRAINOSUS, NOT INTRACTABLE: ICD-10-CM

## 2023-04-19 PROCEDURE — 3078F DIAST BP <80 MM HG: CPT | Performed by: NURSE PRACTITIONER

## 2023-04-19 PROCEDURE — 3074F SYST BP LT 130 MM HG: CPT | Performed by: NURSE PRACTITIONER

## 2023-04-19 PROCEDURE — 99214 OFFICE O/P EST MOD 30 MIN: CPT | Performed by: NURSE PRACTITIONER

## 2023-04-19 NOTE — PROGRESS NOTES
REVIEW OF SYSTEMS    Constitutional: []Fever []Sweats []Chills [] Recent Injury [x] Denies all unless marked  HEENT:[]Headache  [] Head Injury [] Hearing Loss  [] Sore Throat  [] Ear Ache [x] Denies all unless marked  Spine:  [] Neck pain  [] Back pain  [] Sciaticia  [x] Denies all unless marked  Cardiovascular:[]Heart Disease []Palpitations [] Chest Pain   [x] Denies all unless marked  Pulmonary: []Shortness of Breath []Cough   [x] Denies all unless marked  Psychiatric/Behavioral:[] Depression [] Anxiety [x] Denies all unless marked  Gastrointestinal: []Nausea  []Vomiting  []Abdominal Pain  []Constipation  []Diarrhea  [x] Denies all unless marked  Genitourinary:   [] Frequency  [] Urgency  [] Dysuria [] Incontinence  [x] Denies all unless marked  Extremities: []Pain  []Swelling  [x] Denies all unless marked  Musculoskeletal: [] Myalgias  [] Joint Pain  [] Arthritis [] Muscle Cramps [] Muscle Twitches  [x] Denies all unless marked  Sleep: []Insomnia[]Snoring []Restless Legs  []Sleep Apnea  []Daytime Sleepiness  [x] Denies all unless marked  Skin:[] Rash [] Color Change [x] Denies all unless marked   Neurological:[]Visual Disturbance [] Memory Loss []Loss of Balance []Slurred Speech []Weakness []Seizures  [] Dizziness [x] Denies all unless marked
attention and concentration appear appropriate  [x]Recent and remote memory appears unremarkable  [x]Speech normal without dysarthria or aphasia, comprehension and repetition intact. COMMENTS:    Cranial Nerves [x]No VF deficit to confrontation  [x]PERRLA, EOMI, no nystagmus, conjugate eye movements, no ptosis  [x]Face symmetric  [x]Facial sensation intact  [x]Tongue midline no atrophy or fasciculations present  [x]Palate midline, hearing to finger rub normal bilaterally  [x]Shoulder shrug and SCM testing normal bilaterally  COMMENTS:    Motor   []5/5 strength x 4 extremities  [x]Normal bulk and tone  [x]No tremor present  [x]No rigidity or bradykinesia noted  COMMENTS:weakness to RUE and RLE, decreased  strength to right, mild spasticity to right foot- weakness improving   Sensory  []Sensation intact to light touch, pin prick, vibration, and proprioception BLE  []Sensation intact to light touch, pin prick, vibration, and proprioception BUE  COMMENTS:patchy decreased PP to bilateral palms. Decreased PP RLE>LLE   Coordination [x]FTN normal bilaterally   []HTS normal bilaterally  []JUAN normal bilaterally. COMMENTS:decreased JUAN right   Reflexes  []Symmetric and non-pathological  []Toes down going bilaterally  []No clonus present  COMMENTS:hyperreflexia R +hoffmans R, +babinski R, 1 beat clonus right foot   Gait                  []Normal steady gait    []Ataxic    []Spastic     []Magnetic     []Shuffling  COMMENTS:cautious, antalgic, using cane       ADDITIONAL REVIEW:  Narrative   Examination: MRI of the brain with and without IV contrast   Clinical history: Intractable migraine with aura   Comparison: None   Technique: Multiplanar multisequence MR images of the brain were performed with   and without IV contrast.   Findings:   Spicer focus of signal abnormality is seen within the LEFT aziza (series 5, image   8, series 6, image 8, series 3, image 35). This may represent a remote lacunar   infarct. Several tiny

## 2023-05-04 ENCOUNTER — HOSPITAL ENCOUNTER (OUTPATIENT)
Dept: NON INVASIVE DIAGNOSTICS | Age: 53
Discharge: HOME | End: 2023-05-04

## 2023-05-04 DIAGNOSIS — R94.31 ABNORMAL EKG: ICD-10-CM

## 2023-05-04 NOTE — PROGRESS NOTES
Pt here for treadmill stress test. Pt had hx of stroke and entire right side was weak from that. Notified Dr Karly Thomas pt was unable to walk treadmill. Test canceled per Dr Karly Thomas.   She says she will order a different test. Suresh White RN

## 2023-05-05 ENCOUNTER — TELEPHONE (OUTPATIENT)
Dept: PODIATRY | Facility: CLINIC | Age: 53
End: 2023-05-05
Payer: MEDICAID

## 2023-05-05 NOTE — TELEPHONE ENCOUNTER
Called patient regarding appt on 05/08/2023. Left message for patient to return call if any questions or concerns arise.

## 2023-05-13 DIAGNOSIS — G43.119 INTRACTABLE MIGRAINE WITH AURA WITHOUT STATUS MIGRAINOSUS: ICD-10-CM

## 2023-05-15 RX ORDER — NORTRIPTYLINE HYDROCHLORIDE 25 MG/1
25 CAPSULE ORAL NIGHTLY
Qty: 30 CAPSULE | Refills: 3 | Status: SHIPPED | OUTPATIENT
Start: 2023-05-15

## 2023-05-15 NOTE — TELEPHONE ENCOUNTER
Requested Prescriptions     Pending Prescriptions Disp Refills    nortriptyline (PAMELOR) 25 MG capsule [Pharmacy Med Name: Nortriptyline HCl 25 MG Oral Capsule] 30 capsule 3     Sig: TAKE 1 CAPSULE BY MOUTH NIGHTLY       Last Office Visit: 4/19/2023  Next Office Visit: 10/19/2023  Last Medication Refill: 1/18/23 with 3 RF

## 2023-06-16 ENCOUNTER — OFFICE VISIT (OUTPATIENT)
Dept: OBGYN CLINIC | Age: 53
End: 2023-06-16
Payer: MEDICAID

## 2023-06-16 VITALS
BODY MASS INDEX: 42.6 KG/M2 | WEIGHT: 272 LBS | SYSTOLIC BLOOD PRESSURE: 124 MMHG | HEART RATE: 63 BPM | DIASTOLIC BLOOD PRESSURE: 68 MMHG

## 2023-06-16 DIAGNOSIS — R32 URINARY INCONTINENCE, UNSPECIFIED TYPE: Primary | ICD-10-CM

## 2023-06-16 DIAGNOSIS — N39.3 STRESS INCONTINENCE: ICD-10-CM

## 2023-06-16 DIAGNOSIS — Z76.89 ENCOUNTER TO ESTABLISH CARE: ICD-10-CM

## 2023-06-16 PROCEDURE — 99203 OFFICE O/P NEW LOW 30 MIN: CPT | Performed by: NURSE PRACTITIONER

## 2023-06-16 PROCEDURE — 3074F SYST BP LT 130 MM HG: CPT | Performed by: NURSE PRACTITIONER

## 2023-06-16 PROCEDURE — 3078F DIAST BP <80 MM HG: CPT | Performed by: NURSE PRACTITIONER

## 2023-06-16 RX ORDER — BLOOD-GLUCOSE METER
KIT MISCELLANEOUS
COMMUNITY
Start: 2023-05-23

## 2023-06-16 RX ORDER — LANCETS 28 GAUGE
EACH MISCELLANEOUS
COMMUNITY
Start: 2023-05-23

## 2023-06-26 ASSESSMENT — ENCOUNTER SYMPTOMS
EYES NEGATIVE: 1
ALLERGIC/IMMUNOLOGIC NEGATIVE: 1
GASTROINTESTINAL NEGATIVE: 1
RESPIRATORY NEGATIVE: 1

## 2023-08-31 ENCOUNTER — OFFICE VISIT (OUTPATIENT)
Dept: CARDIOLOGY CLINIC | Age: 53
End: 2023-08-31

## 2023-08-31 VITALS
WEIGHT: 265 LBS | SYSTOLIC BLOOD PRESSURE: 130 MMHG | HEART RATE: 86 BPM | BODY MASS INDEX: 41.59 KG/M2 | DIASTOLIC BLOOD PRESSURE: 70 MMHG | HEIGHT: 67 IN

## 2023-08-31 DIAGNOSIS — I47.29 NSVT (NONSUSTAINED VENTRICULAR TACHYCARDIA) (HCC): Primary | ICD-10-CM

## 2023-08-31 RX ORDER — ASPIRIN 81 MG/1
81 TABLET ORAL DAILY
Qty: 90 TABLET | Refills: 1 | COMMUNITY
Start: 2023-08-31

## 2023-08-31 RX ORDER — LOSARTAN POTASSIUM AND HYDROCHLOROTHIAZIDE 25; 100 MG/1; MG/1
1 TABLET ORAL DAILY
Qty: 90 TABLET | Refills: 3 | Status: SHIPPED | OUTPATIENT
Start: 2023-08-31 | End: 2023-11-29

## 2023-08-31 NOTE — PROGRESS NOTES
Cardiology Office Visit Note  [address], [address] [address]  Phone: [phone]  Fax: [phone]                            Date:  8/31/2023  Patient: Velia Joseph  Age:  48 y.o., 1970    Referral: No ref. provider found    REASON FOR VISIT:  6 Month Follow-Up         PROBLEM LIST:    Patient Active Problem List    Diagnosis Date Noted    Heart murmur 11/02/2022     Priority: Medium    Bilateral carotid bruits 11/02/2022     Priority: Medium    Primary hypertension 11/02/2022     Priority: Medium    Obesity (BMI 35.0-39.9 without comorbidity) 11/02/2022     Priority: Medium    Prediabetes 11/02/2022     Priority: Medium    Migraine without status migrainosus, not intractable 11/02/2022     Priority: Medium         PRESENTATION: Velia Joseph is a 48y.o. year old female is seen today for her routine cardiology follow-up appointment. Her past medical history significant for essential hypertension and valvular heart disease further defined as mild aortic stenosis and moderate aortic regurgitation. Patient also with nonsustained SVT and VT on previous cardiac monitor. She also has a history of lacunar CVA with residual right-sided weakness. Since her last office visit in February of this year she reports that she has been doing well without any new or progressively worsening symptoms. REVIEW OF SYSTEMS:  Review of Systems   Constitutional:  Negative for chills, fatigue and fever. HENT:  Negative for congestion, facial swelling, hearing loss and sore throat. Eyes:  Negative for pain, discharge, redness and visual disturbance. Respiratory:  Negative for apnea, cough, chest tightness, shortness of breath and wheezing. Cardiovascular:  Negative for chest pain, palpitations and leg swelling. Gastrointestinal:  Negative for abdominal distention, abdominal pain, blood in stool, constipation, diarrhea, nausea and vomiting.    Endocrine: Negative for polydipsia,

## 2023-09-01 ASSESSMENT — ENCOUNTER SYMPTOMS
DIARRHEA: 0
ABDOMINAL PAIN: 0
FACIAL SWELLING: 0
NAUSEA: 0
APNEA: 0
EYE REDNESS: 0
CONSTIPATION: 0
EYE PAIN: 0
CHEST TIGHTNESS: 0
VOMITING: 0
ABDOMINAL DISTENTION: 0
EYE DISCHARGE: 0
BLOOD IN STOOL: 0
SORE THROAT: 0
COUGH: 0
WHEEZING: 0
SHORTNESS OF BREATH: 0

## 2023-09-12 ENCOUNTER — HOSPITAL ENCOUNTER (OUTPATIENT)
Dept: NUCLEAR MEDICINE | Age: 53
Discharge: HOME | End: 2023-09-14
Attending: INTERNAL MEDICINE
Payer: MEDICAID

## 2023-09-12 DIAGNOSIS — I47.29 NSVT (NONSUSTAINED VENTRICULAR TACHYCARDIA) (HCC): ICD-10-CM

## 2023-09-12 PROCEDURE — A9502 TC99M TETROFOSMIN: HCPCS | Performed by: INTERNAL MEDICINE

## 2023-09-12 PROCEDURE — 93017 CV STRESS TEST TRACING ONLY: CPT

## 2023-09-12 PROCEDURE — 3430000000 HC RX DIAGNOSTIC RADIOPHARMACEUTICAL: Performed by: INTERNAL MEDICINE

## 2023-09-12 PROCEDURE — 6360000002 HC RX W HCPCS: Performed by: INTERNAL MEDICINE

## 2023-09-12 RX ORDER — REGADENOSON 0.08 MG/ML
0.4 INJECTION, SOLUTION INTRAVENOUS
Status: COMPLETED | OUTPATIENT
Start: 2023-09-12 | End: 2023-09-12

## 2023-09-12 RX ADMIN — TETROFOSMIN 24 MILLICURIE: 1.38 INJECTION, POWDER, LYOPHILIZED, FOR SOLUTION INTRAVENOUS at 14:00

## 2023-09-12 RX ADMIN — TETROFOSMIN 8 MILLICURIE: 1.38 INJECTION, POWDER, LYOPHILIZED, FOR SOLUTION INTRAVENOUS at 12:00

## 2023-09-12 RX ADMIN — REGADENOSON 0.4 MG: 0.08 INJECTION, SOLUTION INTRAVENOUS at 13:56

## 2023-09-13 PROCEDURE — 93018 CV STRESS TEST I&R ONLY: CPT | Performed by: INTERNAL MEDICINE

## 2023-09-13 PROCEDURE — 93016 CV STRESS TEST SUPVJ ONLY: CPT | Performed by: INTERNAL MEDICINE

## 2023-09-13 PROCEDURE — 78452 HT MUSCLE IMAGE SPECT MULT: CPT | Performed by: INTERNAL MEDICINE

## 2023-09-15 DIAGNOSIS — G43.119 INTRACTABLE MIGRAINE WITH AURA WITHOUT STATUS MIGRAINOSUS: ICD-10-CM

## 2023-09-15 RX ORDER — NORTRIPTYLINE HYDROCHLORIDE 25 MG/1
25 CAPSULE ORAL NIGHTLY
Qty: 90 CAPSULE | Refills: 0 | Status: SHIPPED | OUTPATIENT
Start: 2023-09-15

## 2023-09-15 NOTE — TELEPHONE ENCOUNTER
Requested Prescriptions     Pending Prescriptions Disp Refills    nortriptyline (PAMELOR) 25 MG capsule [Pharmacy Med Name: Nortriptyline HCl 25 MG Oral Capsule] 90 capsule 0     Sig: TAKE 1 CAPSULE BY MOUTH NIGHTLY       Last Office Visit: 4/19/2023  Next Office Visit: 10/19/2023  Last Medication Refill: 5/15/2023 with 3 RF

## 2023-09-20 ENCOUNTER — TELEPHONE (OUTPATIENT)
Dept: CARDIOLOGY CLINIC | Age: 53
End: 2023-09-20

## 2023-09-20 NOTE — TELEPHONE ENCOUNTER
----- Message from Aisha Omer MD sent at 9/20/2023  2:48 PM CDT -----  Mildly abnormal results. Patient has no symptoms. Treat medically. Continue atenolol, aspirin and losartan-HCTZ.  Follow-up in the office in 3 months

## 2023-12-15 ENCOUNTER — TELEMEDICINE (OUTPATIENT)
Dept: OBGYN CLINIC | Age: 53
End: 2023-12-15
Payer: MEDICAID

## 2023-12-15 DIAGNOSIS — E66.9 OBESITY (BMI 35.0-39.9 WITHOUT COMORBIDITY): ICD-10-CM

## 2023-12-15 DIAGNOSIS — G43.119 INTRACTABLE MIGRAINE WITH AURA WITHOUT STATUS MIGRAINOSUS: ICD-10-CM

## 2023-12-15 DIAGNOSIS — Z86.73 HISTORY OF STROKE: ICD-10-CM

## 2023-12-15 DIAGNOSIS — N39.3 STRESS INCONTINENCE: Primary | ICD-10-CM

## 2023-12-15 PROCEDURE — 99213 OFFICE O/P EST LOW 20 MIN: CPT | Performed by: NURSE PRACTITIONER

## 2023-12-15 RX ORDER — NORTRIPTYLINE HYDROCHLORIDE 25 MG/1
25 CAPSULE ORAL NIGHTLY
Qty: 90 CAPSULE | Refills: 0 | Status: SHIPPED | OUTPATIENT
Start: 2023-12-15

## 2023-12-15 ASSESSMENT — PATIENT HEALTH QUESTIONNAIRE - PHQ9
1. LITTLE INTEREST OR PLEASURE IN DOING THINGS: SEVERAL DAYS
2. FEELING DOWN, DEPRESSED OR HOPELESS: NOT AT ALL
SUM OF ALL RESPONSES TO PHQ9 QUESTIONS 1 & 2: 1

## 2023-12-15 NOTE — TELEPHONE ENCOUNTER
Requested Prescriptions     Pending Prescriptions Disp Refills    nortriptyline (PAMELOR) 25 MG capsule [Pharmacy Med Name: Nortriptyline HCl 25 MG Oral Capsule] 90 capsule 0     Sig: TAKE 1 CAPSULE BY MOUTH NIGHTLY       Last Office Visit: 4/19/2023  Next Office Visit: Visit date not found  Last Medication Refill: 9/15/2023 with 0 RF

## 2023-12-15 NOTE — PROGRESS NOTES
GLUCOSE ONCE DAILY      FreeStyle Lancets MISC USE 1 TO CHECK GLUCOSE ONCE DAILY      hydrOXYzine HCl (ATARAX) 10 MG tablet       busPIRone (BUSPAR) 5 MG tablet       fluticasone (FLONASE) 50 MCG/ACT nasal spray USE 2 SPRAY(S) IN EACH NOSTRIL ONCE DAILY      butalbital-acetaminophen-caffeine (FIORICET, ESGIC) -40 MG per tablet Take 1 tablet PRN every 8 hours as needed for migraine. 10 tablets per 30 days. 30 tablet 0    sertraline (ZOLOFT) 100 MG tablet       losartan-hydroCHLOROthiazide (HYZAAR) 100-25 MG per tablet Take 1 tablet by mouth daily 90 tablet 3    atenolol (TENORMIN) 25 MG tablet Take 1 tablet by mouth daily 90 tablet 3     No current facility-administered medications for this visit. Allergies   Allergen Reactions    Penicillins Hives     There were no vitals filed for this visit. There is no height or weight on file to calculate BMI. Review of Systems    Due to this being a TeleHealth encounter, evaluation of the following organ systems is limited: Vitals/Constitutional/EENT/Resp/CV/GI//MS/Neuro/Skin/Heme-Lymph-Imm. Physical Exam     Diagnosis Orders   1. Stress incontinence        2. Obesity (BMI 35.0-39.9 without comorbidity)        3. History of stroke            MEDICATIONS:  No orders of the defined types were placed in this encounter. ORDERS:  No orders of the defined types were placed in this encounter. PLAN:  Stress incontinence- continue with kegel and pelvic floor exercises along with diet regimen. F/u for annual/pap     Over 50% of the total visit time of 25 minutes was spent on counseling and/or coordination of care of mixed incontinence and alternative treatment therapy. All questions were answered and the patient voiced understanding.     Pursuant to the emergency declaration under the [facility], [address] [place] [address] and the [employer] Act, this Virtual  Visit was

## 2023-12-20 PROBLEM — E66.01 CLASS 3 SEVERE OBESITY DUE TO EXCESS CALORIES WITH BODY MASS INDEX (BMI) OF 40.0 TO 44.9 IN ADULT (HCC): Status: ACTIVE | Noted: 2023-12-20

## 2024-02-15 RX ORDER — ATENOLOL 25 MG/1
25 TABLET ORAL DAILY
Qty: 90 TABLET | Refills: 2 | Status: SHIPPED | OUTPATIENT
Start: 2024-02-15

## 2024-03-12 ENCOUNTER — TELEPHONE (OUTPATIENT)
Dept: CARDIOLOGY CLINIC | Age: 54
End: 2024-03-12

## 2024-03-15 DIAGNOSIS — G43.119 INTRACTABLE MIGRAINE WITH AURA WITHOUT STATUS MIGRAINOSUS: ICD-10-CM

## 2024-03-15 RX ORDER — NORTRIPTYLINE HYDROCHLORIDE 25 MG/1
25 CAPSULE ORAL NIGHTLY
Qty: 90 CAPSULE | Refills: 3 | Status: SHIPPED | OUTPATIENT
Start: 2024-03-15

## 2024-03-15 NOTE — TELEPHONE ENCOUNTER
Requested Prescriptions     Pending Prescriptions Disp Refills    nortriptyline (PAMELOR) 25 MG capsule [Pharmacy Med Name: Nortriptyline HCl 25 MG Oral Capsule] 90 capsule 0     Sig: TAKE 1 CAPSULE BY MOUTH NIGHTLY       Last Office Visit: 4/19/2023  Next Office Visit: Visit date not found  Last Medication Refill: 12/15/2023

## 2024-11-12 NOTE — TELEPHONE ENCOUNTER
1st attempt to contact patient.  I SPOKE WITH HER SISTER AND instructed patient to call back at [phone] to schedule their new patient appointment     Foraminal stenosis of cervical region      MRI CERVICAL AND BRAIN 1/17/23
None

## 2025-02-04 ENCOUNTER — OFFICE VISIT (OUTPATIENT)
Dept: FAMILY MEDICINE CLINIC | Facility: CLINIC | Age: 55
End: 2025-02-04
Payer: MEDICARE

## 2025-02-04 VITALS
HEIGHT: 67 IN | RESPIRATION RATE: 18 BRPM | OXYGEN SATURATION: 98 % | HEART RATE: 90 BPM | BODY MASS INDEX: 36.88 KG/M2 | SYSTOLIC BLOOD PRESSURE: 150 MMHG | TEMPERATURE: 97.7 F | DIASTOLIC BLOOD PRESSURE: 80 MMHG | WEIGHT: 235 LBS

## 2025-02-04 DIAGNOSIS — Z87.891 PERSONAL HISTORY OF TOBACCO USE, PRESENTING HAZARDS TO HEALTH: ICD-10-CM

## 2025-02-04 DIAGNOSIS — I10 PRIMARY HYPERTENSION: Primary | Chronic | ICD-10-CM

## 2025-02-04 DIAGNOSIS — F17.210 NICOTINE DEPENDENCE, CIGARETTES, UNCOMPLICATED: ICD-10-CM

## 2025-02-04 DIAGNOSIS — M79.10 MUSCLE PAIN: ICD-10-CM

## 2025-02-04 PROCEDURE — 1125F AMNT PAIN NOTED PAIN PRSNT: CPT | Performed by: NURSE PRACTITIONER

## 2025-02-04 PROCEDURE — 3077F SYST BP >= 140 MM HG: CPT | Performed by: NURSE PRACTITIONER

## 2025-02-04 PROCEDURE — 1159F MED LIST DOCD IN RCRD: CPT | Performed by: NURSE PRACTITIONER

## 2025-02-04 PROCEDURE — 3079F DIAST BP 80-89 MM HG: CPT | Performed by: NURSE PRACTITIONER

## 2025-02-04 PROCEDURE — 1160F RVW MEDS BY RX/DR IN RCRD: CPT | Performed by: NURSE PRACTITIONER

## 2025-02-04 PROCEDURE — 99203 OFFICE O/P NEW LOW 30 MIN: CPT | Performed by: NURSE PRACTITIONER

## 2025-02-04 RX ORDER — LOSARTAN POTASSIUM 50 MG/1
50 TABLET ORAL DAILY
COMMUNITY
End: 2025-02-04

## 2025-02-04 RX ORDER — HYDROCHLOROTHIAZIDE 25 MG/1
1 TABLET ORAL DAILY
COMMUNITY
Start: 2025-01-27

## 2025-02-04 RX ORDER — LOSARTAN POTASSIUM 100 MG/1
100 TABLET ORAL DAILY
Qty: 30 TABLET | Refills: 0 | Status: SHIPPED | OUTPATIENT
Start: 2025-02-04 | End: 2025-03-06

## 2025-02-04 NOTE — PROGRESS NOTES
"Chief Complaint   Patient presents with    Back Pain     Pt was seen at [facility] ER on 1/27/2025 for back pain    Hypertension        Subjective   Denisha Chi is a 54 y.o. female who presents today for the following:    HPI   Went to ER on Monday 1 week ago for back pain.   Low back pain- Went to ER and CT was performed and diagnosed her with muscle spasm.   Right upper back. Improved from ER. She was prescribed muscle relaxers and symptoms improved.     HTN- Patient has been checking blood pressure at home. It has been running high. Patient states she can tell when it high. She is currently on Losartan and hctz.     Allergies   Allergen Reactions    Amlodipine Cough    Lisinopril Itching and Cough    Penicillins Hives         OBJECTIVE:  Vitals:    02/04/25 0929 02/04/25 0946 02/04/25 1009   BP: (!) 208/123 (!) 184/94 150/80   BP Location: Left arm Left arm Left arm   Patient Position: Sitting Sitting Sitting   Cuff Size: Large Adult Large Adult Large Adult   Pulse: 90     Resp: 18     Temp: 97.7 °F (36.5 °C)     TempSrc: Infrared     SpO2: 98%     Weight: 107 kg (235 lb)     Height: 170.8 cm (67.25\")       Physical Exam  Vitals and nursing note reviewed.   Cardiovascular:      Rate and Rhythm: Normal rate and regular rhythm.   Pulmonary:      Breath sounds: Normal breath sounds. No wheezing or rhonchi.   Abdominal:      General: Bowel sounds are normal.   Musculoskeletal:        Back:       Comments: Area marked- tender on palpation.     Skin:     General: Skin is warm and dry.   Neurological:      Mental Status: She is alert and oriented to person, place, and time.      Gait: Gait abnormal (patient walking with a cane.).   Psychiatric:         Mood and Affect: Mood normal.         Behavior: Behavior normal.         Thought Content: Thought content normal.         Judgment: Judgment normal.         Class 2 Severe Obesity (BMI >=35 and <=39.9). Obesity-related health conditions include the following: hypertension. " Obesity is unchanged. BMI is is above average; BMI management plan is completed. We discussed portion control and increasing exercise.          ASSESSMENT/ PLAN:  Assessment & Plan  Primary hypertension  Losartan increased to 100mg. Advised patient to monitor blood pressure.     Orders:    losartan (Cozaar) 100 MG tablet; Take 1 tablet by mouth Daily for 30 days.    Personal history of tobacco use, presenting hazards to health    Orders:    CT chest low dose wo; Future    Nicotine dependence, cigarettes, uncomplicated                                            Orders:    CT chest low dose wo; Future    Muscle pain              Procedures     Management Plan:     An After Visit Summary was printed and given to the patient at discharge.    Follow-up: Return in about 2 weeks (around 2/18/2025) for follow up on blood pressure. .         Katherine Berrios, APRN 2/4/2025 14:53 CST  This note was electronically signed.

## 2025-02-04 NOTE — ASSESSMENT & PLAN NOTE
Losartan increased to 100mg. Advised patient to monitor blood pressure.     Orders:    losartan (Cozaar) 100 MG tablet; Take 1 tablet by mouth Daily for 30 days.

## 2025-02-21 ENCOUNTER — OFFICE VISIT (OUTPATIENT)
Dept: FAMILY MEDICINE CLINIC | Facility: CLINIC | Age: 55
End: 2025-02-21
Payer: MEDICARE

## 2025-02-21 VITALS
SYSTOLIC BLOOD PRESSURE: 168 MMHG | BODY MASS INDEX: 37.2 KG/M2 | OXYGEN SATURATION: 97 % | HEART RATE: 77 BPM | HEIGHT: 67 IN | TEMPERATURE: 98.2 F | WEIGHT: 237 LBS | DIASTOLIC BLOOD PRESSURE: 87 MMHG

## 2025-02-21 DIAGNOSIS — G89.29 CHRONIC BILATERAL LOW BACK PAIN WITH BILATERAL SCIATICA: ICD-10-CM

## 2025-02-21 DIAGNOSIS — Z13.1 SCREENING FOR DIABETES MELLITUS: Primary | ICD-10-CM

## 2025-02-21 DIAGNOSIS — I10 PRIMARY HYPERTENSION: Chronic | ICD-10-CM

## 2025-02-21 DIAGNOSIS — Z13.6 SCREENING FOR CARDIOVASCULAR CONDITION: ICD-10-CM

## 2025-02-21 DIAGNOSIS — M54.42 CHRONIC BILATERAL LOW BACK PAIN WITH BILATERAL SCIATICA: ICD-10-CM

## 2025-02-21 DIAGNOSIS — E78.2 MIXED HYPERLIPIDEMIA: ICD-10-CM

## 2025-02-21 DIAGNOSIS — Z11.59 ENCOUNTER FOR HEPATITIS C SCREENING TEST FOR LOW RISK PATIENT: ICD-10-CM

## 2025-02-21 DIAGNOSIS — M54.41 CHRONIC BILATERAL LOW BACK PAIN WITH BILATERAL SCIATICA: ICD-10-CM

## 2025-02-21 RX ORDER — CARVEDILOL 3.12 MG/1
3.12 TABLET ORAL 2 TIMES DAILY WITH MEALS
Qty: 60 TABLET | Refills: 0 | Status: SHIPPED | OUTPATIENT
Start: 2025-02-21 | End: 2025-03-23

## 2025-02-21 RX ORDER — HYDROCHLOROTHIAZIDE 25 MG/1
25 TABLET ORAL DAILY
Qty: 30 TABLET | Refills: 5 | Status: SHIPPED | OUTPATIENT
Start: 2025-02-21

## 2025-02-21 NOTE — PROGRESS NOTES
"Chief Complaint   Patient presents with   • Hypertension     2 week f/u   • Back Pain     2 week f/u - pain stays pretty consistent, severe pain is non-existent when taking tylenol regularly        Subjective   Denisha Chi is a 54 y.o. female who presents today for the following:    Hypertension  This is a chronic problem. The problem is uncontrolled.   Back Pain  This is a chronic problem. The pain is present in the lumbar spine and thoracic spine.    Lumbar hurts worse today. She has problems with her sciatic nerve. states she has been to pain management in the past but she rather use marijuana.    Htn- 148/80 last night   Allergies   Allergen Reactions   • Amlodipine Cough   • Lisinopril Itching and Cough   • Penicillins Hives         OBJECTIVE:  Vitals:    02/21/25 0940 02/21/25 1010   BP: 170/89 168/87   BP Location: Left arm    Patient Position: Sitting    Pulse: 77    Temp: 98.2 °F (36.8 °C)    TempSrc: Temporal    SpO2: 97%    Weight: 108 kg (237 lb)    Height: 170.2 cm (67\")      Physical Exam  Vitals and nursing note reviewed.   Cardiovascular:      Rate and Rhythm: Normal rate and regular rhythm.   Pulmonary:      Breath sounds: Normal breath sounds. No wheezing or rhonchi.   Abdominal:      General: Bowel sounds are normal.   Musculoskeletal:        Back:       Comments: Area marked- tender on palpation.     Skin:     General: Skin is warm and dry.   Neurological:      Mental Status: She is alert and oriented to person, place, and time.      Gait: Gait abnormal (patient walking with a cane.).   Psychiatric:         Mood and Affect: Mood normal.         Behavior: Behavior normal.         Thought Content: Thought content normal.         Judgment: Judgment normal.                 ASSESSMENT/ PLAN:  Assessment & Plan  Screening for diabetes mellitus    Orders:  •  Hemoglobin A1c    Screening for cardiovascular condition    Orders:  •  Lipid Panel With LDL / HDL Ratio    Primary " hypertension      Orders:  •  CBC & Differential  •  Comprehensive Metabolic Panel  •  Lipid Panel With LDL / HDL Ratio  •  TSH Rfx On Abnormal To Free T4    Encounter for hepatitis C screening test for low risk patient    Orders:  •  Hepatitis C Antibody    Chronic bilateral low back pain with bilateral sciatica              Procedures     Management Plan:     An After Visit Summary was printed and given to the patient at discharge.    Follow-up: Return in about 2 weeks (around 3/7/2025) for follow up on HTN .         Katherine Berrios, APRN 2/21/2025 16:20 CST  This note was electronically signed.

## 2025-02-21 NOTE — ASSESSMENT & PLAN NOTE
Orders:    CBC & Differential    Comprehensive Metabolic Panel    Lipid Panel With LDL / HDL Ratio    TSH Rfx On Abnormal To Free T4

## 2025-02-22 LAB
ALBUMIN SERPL-MCNC: 4.5 G/DL (ref 3.8–4.9)
ALP SERPL-CCNC: 119 IU/L (ref 44–121)
ALT SERPL-CCNC: 17 IU/L (ref 0–32)
AST SERPL-CCNC: 21 IU/L (ref 0–40)
BASOPHILS # BLD AUTO: 0 X10E3/UL (ref 0–0.2)
BASOPHILS NFR BLD AUTO: 0 %
BILIRUB SERPL-MCNC: 0.2 MG/DL (ref 0–1.2)
BUN SERPL-MCNC: 17 MG/DL (ref 6–24)
BUN/CREAT SERPL: 20 (ref 9–23)
CALCIUM SERPL-MCNC: 10 MG/DL (ref 8.7–10.2)
CHLORIDE SERPL-SCNC: 99 MMOL/L (ref 96–106)
CHOLEST SERPL-MCNC: 196 MG/DL (ref 100–199)
CO2 SERPL-SCNC: 23 MMOL/L (ref 20–29)
CREAT SERPL-MCNC: 0.84 MG/DL (ref 0.57–1)
EGFRCR SERPLBLD CKD-EPI 2021: 84 ML/MIN/1.73
EOSINOPHIL # BLD AUTO: 0.3 X10E3/UL (ref 0–0.4)
EOSINOPHIL NFR BLD AUTO: 4 %
ERYTHROCYTE [DISTWIDTH] IN BLOOD BY AUTOMATED COUNT: 12.9 % (ref 11.7–15.4)
GLOBULIN SER CALC-MCNC: 2.9 G/DL (ref 1.5–4.5)
GLUCOSE SERPL-MCNC: 86 MG/DL (ref 70–99)
HBA1C MFR BLD: 5.8 % (ref 4.8–5.6)
HCT VFR BLD AUTO: 47.5 % (ref 34–46.6)
HCV IGG SERPL QL IA: NON REACTIVE
HDLC SERPL-MCNC: 36 MG/DL
HGB BLD-MCNC: 15.6 G/DL (ref 11.1–15.9)
IMM GRANULOCYTES # BLD AUTO: 0 X10E3/UL (ref 0–0.1)
IMM GRANULOCYTES NFR BLD AUTO: 0 %
LDLC SERPL CALC-MCNC: 132 MG/DL (ref 0–99)
LDLC/HDLC SERPL: 3.7 RATIO (ref 0–3.2)
LYMPHOCYTES # BLD AUTO: 3.2 X10E3/UL (ref 0.7–3.1)
LYMPHOCYTES NFR BLD AUTO: 33 %
MCH RBC QN AUTO: 29.8 PG (ref 26.6–33)
MCHC RBC AUTO-ENTMCNC: 32.8 G/DL (ref 31.5–35.7)
MCV RBC AUTO: 91 FL (ref 79–97)
MONOCYTES # BLD AUTO: 0.7 X10E3/UL (ref 0.1–0.9)
MONOCYTES NFR BLD AUTO: 7 %
NEUTROPHILS # BLD AUTO: 5.5 X10E3/UL (ref 1.4–7)
NEUTROPHILS NFR BLD AUTO: 56 %
PLATELET # BLD AUTO: 251 X10E3/UL (ref 150–450)
POTASSIUM SERPL-SCNC: 4.4 MMOL/L (ref 3.5–5.2)
PROT SERPL-MCNC: 7.4 G/DL (ref 6–8.5)
RBC # BLD AUTO: 5.24 X10E6/UL (ref 3.77–5.28)
SODIUM SERPL-SCNC: 139 MMOL/L (ref 134–144)
TRIGL SERPL-MCNC: 156 MG/DL (ref 0–149)
TSH SERPL DL<=0.005 MIU/L-ACNC: 1.94 UIU/ML (ref 0.45–4.5)
VLDLC SERPL CALC-MCNC: 28 MG/DL (ref 5–40)
WBC # BLD AUTO: 9.9 X10E3/UL (ref 3.4–10.8)

## 2025-02-24 DIAGNOSIS — Z87.891 PERSONAL HISTORY OF TOBACCO USE, PRESENTING HAZARDS TO HEALTH: ICD-10-CM

## 2025-02-24 DIAGNOSIS — F17.210 NICOTINE DEPENDENCE, CIGARETTES, UNCOMPLICATED: ICD-10-CM

## 2025-02-24 RX ORDER — ATORVASTATIN CALCIUM 20 MG/1
20 TABLET, FILM COATED ORAL DAILY
Qty: 90 TABLET | Refills: 1 | Status: SHIPPED | OUTPATIENT
Start: 2025-02-24

## 2025-03-07 ENCOUNTER — OFFICE VISIT (OUTPATIENT)
Dept: FAMILY MEDICINE CLINIC | Facility: CLINIC | Age: 55
End: 2025-03-07
Payer: MEDICARE

## 2025-03-07 VITALS
SYSTOLIC BLOOD PRESSURE: 153 MMHG | DIASTOLIC BLOOD PRESSURE: 72 MMHG | TEMPERATURE: 98.7 F | BODY MASS INDEX: 37.07 KG/M2 | HEIGHT: 67 IN | WEIGHT: 236.2 LBS | RESPIRATION RATE: 19 BRPM | OXYGEN SATURATION: 98 % | HEART RATE: 86 BPM

## 2025-03-07 VITALS
WEIGHT: 236 LBS | DIASTOLIC BLOOD PRESSURE: 72 MMHG | TEMPERATURE: 98.7 F | OXYGEN SATURATION: 98 % | SYSTOLIC BLOOD PRESSURE: 153 MMHG | HEART RATE: 86 BPM | HEIGHT: 67 IN | BODY MASS INDEX: 37.04 KG/M2

## 2025-03-07 DIAGNOSIS — I10 PRIMARY HYPERTENSION: Primary | Chronic | ICD-10-CM

## 2025-03-07 DIAGNOSIS — Z00.00 MEDICARE ANNUAL WELLNESS VISIT, SUBSEQUENT: Primary | ICD-10-CM

## 2025-03-07 PROCEDURE — 1170F FXNL STATUS ASSESSED: CPT | Performed by: NURSE PRACTITIONER

## 2025-03-07 PROCEDURE — 3078F DIAST BP <80 MM HG: CPT | Performed by: NURSE PRACTITIONER

## 2025-03-07 PROCEDURE — 3077F SYST BP >= 140 MM HG: CPT | Performed by: NURSE PRACTITIONER

## 2025-03-07 PROCEDURE — 1125F AMNT PAIN NOTED PAIN PRSNT: CPT | Performed by: NURSE PRACTITIONER

## 2025-03-07 PROCEDURE — G0439 PPPS, SUBSEQ VISIT: HCPCS | Performed by: NURSE PRACTITIONER

## 2025-03-07 RX ORDER — LOSARTAN POTASSIUM 50 MG/1
1 TABLET ORAL DAILY
COMMUNITY
Start: 2025-02-25 | End: 2025-03-07

## 2025-03-07 RX ORDER — CARVEDILOL PHOSPHATE 10 MG/1
10 CAPSULE, EXTENDED RELEASE ORAL DAILY
Qty: 30 CAPSULE | Refills: 0 | Status: SHIPPED | OUTPATIENT
Start: 2025-03-07

## 2025-03-07 RX ORDER — ASPIRIN 81 MG/1
81 TABLET ORAL DAILY
COMMUNITY

## 2025-03-07 NOTE — PROGRESS NOTES
"Chief Complaint   Patient presents with    Hypertension     2 week follow up        Subjective   Denisha Chi is a 54 y.o. female who presents today for the following:    Hypertension  This is a chronic problem. The problem is uncontrolled.   Patient states she is having a hard time remembering to take her second dose of coreg.  Her blood pressure at home is running around 150s/ 70's.       Allergies   Allergen Reactions    Amlodipine Cough    Lisinopril Itching and Cough    Penicillins Hives         OBJECTIVE:  Vitals:    03/07/25 1010   BP: 153/72   BP Location: Left arm   Patient Position: Sitting   Cuff Size: Adult   Pulse: 86   Temp: 98.7 °F (37.1 °C)   TempSrc: Infrared   SpO2: 98%   Weight: 107 kg (236 lb)   Height: 170.2 cm (67\")     Physical Exam  Vitals and nursing note reviewed.   Constitutional:       Appearance: Normal appearance.   Cardiovascular:      Rate and Rhythm: Normal rate and regular rhythm.      Heart sounds: Murmur heard.   Pulmonary:      Effort: Pulmonary effort is normal.      Breath sounds: No wheezing or rhonchi.   Musculoskeletal:      Comments: Walking with cane.    Neurological:      Mental Status: She is alert and oriented to person, place, and time.   Psychiatric:         Mood and Affect: Mood normal.         Behavior: Behavior normal.                    ASSESSMENT/ PLAN:  Assessment & Plan  Primary hypertension  Continue to monitor blood pressure at home     Orders:    carvedilol CR (Coreg CR) 10 MG 24 hr capsule; Take 1 capsule by mouth Daily.  Coreg changed to extended release, so patient only has to take daily. Discussed with patient.        Procedures     Management Plan:     An After Visit Summary was printed and given to the patient at discharge.    Follow-up: Return in about 1 month (around 4/7/2025) for HTN.         Katherine Berrios, JOSE ALFREDO 3/7/2025 10:43 CST  This note was electronically signed.          "

## 2025-03-07 NOTE — PROGRESS NOTES
Subjective   The ABCs of the Annual Wellness Visit  Medicare Wellness Visit      Denisha Chi is a 54 y.o. patient who presents for a Medicare Wellness Visit.    The following portions of the patient's history were reviewed and   updated as appropriate: allergies, current medications, past family history, past medical history, past social history, past surgical history, and problem list.    Compared to one year ago, the patient's physical   health is the same.  Compared to one year ago, the patient's mental   health is the same.    Recent Hospitalizations:  She was not admitted to the hospital during the last year.     Current Medical Providers:  Patient Care Team:  Katherine Berrios APRN as PCP - General (Family Medicine)  Caitlin Kwok APRN as Nurse Practitioner (Cardiology)    Outpatient Medications Prior to Visit   Medication Sig Dispense Refill    aspirin-acetaminophen-caffeine (EXCEDRIN MIGRAINE) 250-250-65 MG per tablet Take 1 tablet by mouth Every 6 (Six) Hours As Needed for Headache.      hydroCHLOROthiazide 25 MG tablet Take 1 tablet by mouth Daily. 30 tablet 5    MAGNESIUM GLUCONATE PO Take 1 tablet by mouth Daily.      Multiple Vitamins-Minerals (EQ MULTIVITAMINS ADULT GUMMY PO) Take  by mouth.      carvedilol (Coreg) 3.125 MG tablet Take 1 tablet by mouth 2 (Two) Times a Day With Meals for 30 days. (Patient taking differently: Take 1 tablet by mouth Daily.) 60 tablet 0    losartan (COZAAR) 50 MG tablet Take 1 tablet by mouth Daily.      aspirin 81 MG EC tablet Take 1 tablet by mouth Daily.      atorvastatin (LIPITOR) 20 MG tablet Take 1 tablet by mouth Daily. 90 tablet 1    losartan (Cozaar) 100 MG tablet Take 1 tablet by mouth Daily for 30 days. 30 tablet 0    tiZANidine (ZANAFLEX) 4 MG tablet Take 1 tablet by mouth Every 6 (Six) Hours. (Patient not taking: Reported on 3/7/2025)       No facility-administered medications prior to visit.     No opioid medication identified on active  "medication list. I have reviewed chart for other potential  high risk medication/s and harmful drug interactions in the elderly.      Aspirin is not on active medication list.  Aspirin use is indicated based on review of current medical condition/s. Pros and cons of this therapy have been discussed with this patient. Benefits of this medication outweigh potential harm.  Patient has been instructed to start taking this medication..    Patient Active Problem List   Diagnosis    Primary hypertension     Advance Care Planning Advance Directive is not on file.  ACP discussion was held with the patient during this visit. Patient does not have an advance directive, information provided.            Objective   Vitals:    03/07/25 0954 03/07/25 1007   BP: 168/97 153/72   BP Location: Left arm Left arm   Patient Position: Sitting Sitting   Cuff Size: Adult Large Adult   Pulse: 86    Resp: 19    Temp: 98.7 °F (37.1 °C)    TempSrc: Infrared    SpO2: 98%    Weight: 107 kg (236 lb 3.2 oz)    Height: 170.2 cm (67\")    PainSc: 2     PainLoc: Back        Estimated body mass index is 36.99 kg/m² as calculated from the following:    Height as of this encounter: 170.2 cm (67\").    Weight as of this encounter: 107 kg (236 lb 3.2 oz).                Does the patient have evidence of cognitive impairment? No  Lab Results   Component Value Date    CHLPL 196 02/21/2025    TRIG 156 (H) 02/21/2025    HDL 36 (L) 02/21/2025     (H) 02/21/2025    VLDL 28 02/21/2025    HGBA1C 5.8 (H) 02/21/2025                                                                                               Health  Risk Assessment    Smoking Status:  Social History     Tobacco Use   Smoking Status Every Day    Current packs/day: 1.00    Average packs/day: 1 pack/day for 35.2 years (35.2 ttl pk-yrs)    Types: Cigarettes    Start date: 1990    Passive exposure: Current   Smokeless Tobacco Never     Alcohol Consumption:  Social History     Substance and Sexual " Activity   Alcohol Use Not Currently       Fall Risk Screen  JALEELADI Fall Risk Assessment was completed, and patient is at MODERATE risk for falls. Assessment completed on:3/7/2025    Depression Screening   Little interest or pleasure in doing things? Not at all   Feeling down, depressed, or hopeless? Not at all   PHQ-2 Total Score 0      Health Habits and Functional and Cognitive Screening:      3/7/2025    10:02 AM   Functional & Cognitive Status   Do you have difficulty preparing food and eating? No   Do you have difficulty bathing yourself, getting dressed or grooming yourself? No   Do you have difficulty using the toilet? No   Do you have difficulty moving around from place to place? Yes   Do you have trouble with steps or getting out of a bed or a chair? Yes   Current Diet Well Balanced Diet   Dental Exam Not up to date   Eye Exam Not up to date   Exercise (times per week) 7 times per week   Current Exercises Include Walking;Other   Do you need help using the phone?  No   Are you deaf or do you have serious difficulty hearing?  No   Do you need help to go to places out of walking distance? Yes   Do you need help shopping? No   Do you need help preparing meals?  No   Do you need help with housework?  No   Do you need help with laundry? No   Do you need help taking your medications? No   Do you need help managing money? No   Do you ever drive or ride in a car without wearing a seat belt? No   Have you felt unusual stress, anger or loneliness in the last month? Yes   Who do you live with? Sibling   If you need help, do you have trouble finding someone available to you? No   Have you been bothered in the last four weeks by sexual problems? No   Do you have difficulty concentrating, remembering or making decisions? Yes           Age-appropriate Screening Schedule:  Refer to the list below for future screening recommendations based on patient's age, sex and/or medical conditions. Orders for these recommended tests  are listed in the plan section. The patient has been provided with a written plan.    Health Maintenance List  Health Maintenance   Topic Date Due    Pneumococcal Vaccine 50+ (1 of 2 - PCV) Never done    TDAP/TD VACCINES (1 - Tdap) Never done    ZOSTER VACCINE (1 of 2) Never done    PAP SMEAR  Never done    INFLUENZA VACCINE  Never done    COVID-19 Vaccine (1 - 2024-25 season) Never done    MAMMOGRAM  09/28/2025    BMI FOLLOWUP  02/04/2026    LIPID PANEL  02/21/2026    LUNG CANCER SCREENING  02/22/2026    ANNUAL WELLNESS VISIT  03/07/2026    COLORECTAL CANCER SCREENING  10/05/2033    HEPATITIS C SCREENING  Completed                                                                                                                                                CMS Preventative Services Quick Reference  Risk Factors Identified During Encounter  Chronic Pain: Natural history and expected course discussed. Questions answered.  Immunizations Discussed/Encouraged: Tdap, Influenza, and Shingrix  Inactivity/Sedentary: Patient was advised to exercise at least 150 minutes a week per CDC recommendations.  Dental Screening Recommended  Vision Screening Recommended    The above risks/problems have been discussed with the patient.  Pertinent information has been shared with the patient in the After Visit Summary.  An After Visit Summary and PPPS were made available to the patient.    Follow Up:   Next Medicare Wellness visit to be scheduled in 1 year.     Assessment & Plan  Medicare annual wellness visit, subsequent              Follow Up:   No follow-ups on file.

## 2025-03-07 NOTE — ASSESSMENT & PLAN NOTE
Continue to monitor blood pressure at home     Orders:    carvedilol CR (Coreg CR) 10 MG 24 hr capsule; Take 1 capsule by mouth Daily.  Coreg changed to extended release, so patient only has to take daily. Discussed with patient.

## 2025-03-30 DIAGNOSIS — I10 PRIMARY HYPERTENSION: Chronic | ICD-10-CM

## 2025-03-31 RX ORDER — LOSARTAN POTASSIUM 100 MG/1
100 TABLET ORAL DAILY
Qty: 30 TABLET | Refills: 0 | Status: SHIPPED | OUTPATIENT
Start: 2025-03-31 | End: 2025-04-30

## 2025-03-31 RX ORDER — CARVEDILOL 3.12 MG/1
3.12 TABLET ORAL 2 TIMES DAILY WITH MEALS
Qty: 60 TABLET | Refills: 0 | OUTPATIENT
Start: 2025-03-31

## 2025-03-31 NOTE — TELEPHONE ENCOUNTER
Rx Refill Note  Requested Prescriptions     Pending Prescriptions Disp Refills    losartan (COZAAR) 100 MG tablet [Pharmacy Med Name: Losartan Potassium 100 MG Oral Tablet] 30 tablet 0     Sig: Take 1 tablet by mouth once daily for 30 days    carvedilol (COREG) 3.125 MG tablet [Pharmacy Med Name: Carvedilol 3.125 MG Oral Tablet] 60 tablet 0     Sig: TAKE 1 TABLET BY MOUTH TWICE DAILY WITH MEALS       Karrie Hendricks  03/31/25, 14:16 CDT

## 2025-04-01 ENCOUNTER — TELEPHONE (OUTPATIENT)
Dept: FAMILY MEDICINE CLINIC | Facility: CLINIC | Age: 55
End: 2025-04-01
Payer: MEDICARE

## 2025-04-01 NOTE — TELEPHONE ENCOUNTER
Pharmacy Name:  [facility]    Pharmacy representative name: ROEL    Pharmacy representative phone number: [phone] OPTION 3    What medication are you calling in regards to: carvedilol CR (Coreg CR) 10 MG 24 hr capsule     What question does the pharmacy have:     Who is the provider that prescribed the medication: JOSE ALFREDO VEGA    Additional notes:  ROEL FROM [facility] IS CALLING TO DOUBLE CHECK TO SEE WHAT THE PATIENT HAS TRIED AND FAILED IN THE SAME CLASS OF THE MEDICATION LISTED ABOVE.    ALSO, TO SEE WHAT ALTERNATIVE MEDICATION THE PATIENT HAS TRIED AND IF THEY MAY NOT HAVE BEEN AS EFFECTIVE OR HAD ADVERSE EVENTS    PLEASE CALL TO ADVISE

## 2025-04-02 NOTE — TELEPHONE ENCOUNTER
"Attempted contact - was on hold for 15 mins. [facility] [place] \"we are experiencing longer than usual wait times, please call back\". Will retry  "

## 2025-04-04 NOTE — TELEPHONE ENCOUNTER
Coreg 10 mg is not approved by insurance. She must have tried and failed 2 more beta lockers prior to approving Coreg 10 mg.   We will receive a faxed denial with recommendations

## 2025-04-08 ENCOUNTER — OFFICE VISIT (OUTPATIENT)
Dept: FAMILY MEDICINE CLINIC | Facility: CLINIC | Age: 55
End: 2025-04-08
Payer: MEDICARE

## 2025-04-08 VITALS
DIASTOLIC BLOOD PRESSURE: 78 MMHG | OXYGEN SATURATION: 97 % | BODY MASS INDEX: 37.83 KG/M2 | HEART RATE: 98 BPM | TEMPERATURE: 98.2 F | WEIGHT: 241 LBS | SYSTOLIC BLOOD PRESSURE: 138 MMHG | HEIGHT: 67 IN | RESPIRATION RATE: 18 BRPM

## 2025-04-08 DIAGNOSIS — I10 PRIMARY HYPERTENSION: Primary | Chronic | ICD-10-CM

## 2025-04-08 PROCEDURE — 3078F DIAST BP <80 MM HG: CPT | Performed by: NURSE PRACTITIONER

## 2025-04-08 PROCEDURE — 1160F RVW MEDS BY RX/DR IN RCRD: CPT | Performed by: NURSE PRACTITIONER

## 2025-04-08 PROCEDURE — 1159F MED LIST DOCD IN RCRD: CPT | Performed by: NURSE PRACTITIONER

## 2025-04-08 PROCEDURE — 1125F AMNT PAIN NOTED PAIN PRSNT: CPT | Performed by: NURSE PRACTITIONER

## 2025-04-08 PROCEDURE — 99213 OFFICE O/P EST LOW 20 MIN: CPT | Performed by: NURSE PRACTITIONER

## 2025-04-08 PROCEDURE — 3075F SYST BP GE 130 - 139MM HG: CPT | Performed by: NURSE PRACTITIONER

## 2025-04-08 RX ORDER — CARVEDILOL 3.12 MG/1
3.12 TABLET ORAL EVERY 12 HOURS SCHEDULED
Qty: 60 TABLET | Refills: 0 | Status: SHIPPED | OUTPATIENT
Start: 2025-04-08 | End: 2025-05-08

## 2025-04-08 RX ORDER — ADHESIVE BANDAGE 3/4"
BANDAGE TOPICAL
Qty: 1 EACH | Refills: 0 | Status: SHIPPED | OUTPATIENT
Start: 2025-04-08

## 2025-04-08 RX ORDER — LOSARTAN POTASSIUM 100 MG/1
100 TABLET ORAL DAILY
Qty: 30 TABLET | Refills: 5 | Status: SHIPPED | OUTPATIENT
Start: 2025-04-08 | End: 2025-10-05

## 2025-04-08 RX ORDER — CARVEDILOL 3.12 MG/1
3.12 TABLET ORAL 2 TIMES DAILY WITH MEALS
Qty: 60 TABLET | Refills: 0 | Status: CANCELLED | OUTPATIENT
Start: 2025-04-08 | End: 2025-05-08

## 2025-04-08 RX ORDER — HYDROCHLOROTHIAZIDE 25 MG/1
25 TABLET ORAL DAILY
Qty: 30 TABLET | Refills: 5 | Status: SHIPPED | OUTPATIENT
Start: 2025-04-08

## 2025-04-08 NOTE — ASSESSMENT & PLAN NOTE
Hypertension is borderline  Medication changes per orders.  Blood pressure will be reassessedin 4 weeks.    Orders:    hydroCHLOROthiazide 25 MG tablet; Take 1 tablet by mouth Daily.    losartan (COZAAR) 100 MG tablet; Take 1 tablet by mouth Daily for 180 days.    Blood Pressure Monitoring (Blood Pressure Cuff) misc; Check blood pressure twice day    carvedilol (Coreg) 3.125 MG tablet; Take 1 tablet by mouth Every 12 (Twelve) Hours for 30 days.  Coreg 3.125mg twice a day added to medication regimen. Patient advised to monitor blood pressure at home.

## 2025-04-08 NOTE — PROGRESS NOTES
"Chief Complaint   Patient presents with    Hypertension    Med Refill        Subjective   Denisha Chi is a 54 y.o. female who presents today for the following:    Hypertension  This is a chronic problem. Treatments tried: losartan and HCTZ.Patient was on coreg extended release but insurance will not pay for it. There are no compliance problems.    Patient states she takes blood pressure at home. Insurance will not pain for extended release coreg.       Allergies   Allergen Reactions    Amlodipine Cough    Lisinopril Itching and Cough    Penicillins Hives         OBJECTIVE:  Vitals:    04/08/25 0937   BP: 138/78   BP Location: Left arm   Patient Position: Sitting   Cuff Size: Large Adult   Pulse: 98   Resp: 18   Temp: 98.2 °F (36.8 °C)   TempSrc: Oral   SpO2: 97%   Weight: 109 kg (241 lb)   Height: 170.2 cm (67\")     Physical Exam  Vitals and nursing note reviewed.   Constitutional:       Appearance: Normal appearance.   HENT:      Head: Normocephalic and atraumatic.      Nose: Nose normal.   Eyes:      Pupils: Pupils are equal, round, and reactive to light.   Cardiovascular:      Rate and Rhythm: Normal rate and regular rhythm.      Heart sounds: Murmur heard.   Pulmonary:      Breath sounds: Normal breath sounds. No wheezing or rhonchi.   Skin:     General: Skin is warm and dry.   Neurological:      Mental Status: She is alert.   Psychiatric:         Mood and Affect: Mood normal.         Behavior: Behavior normal.                    ASSESSMENT/ PLAN:  Assessment & Plan  Primary hypertension  Hypertension is borderline  Medication changes per orders.  Blood pressure will be reassessedin 4 weeks.    Orders:    hydroCHLOROthiazide 25 MG tablet; Take 1 tablet by mouth Daily.    losartan (COZAAR) 100 MG tablet; Take 1 tablet by mouth Daily for 180 days.    Blood Pressure Monitoring (Blood Pressure Cuff) misc; Check blood pressure twice day    carvedilol (Coreg) 3.125 MG tablet; Take 1 tablet by mouth Every 12 " (Twelve) Hours for 30 days.  Coreg 3.125mg twice a day added to medication regimen. Patient advised to monitor blood pressure at home.        Procedures     Management Plan:     An After Visit Summary was printed and given to the patient at discharge.    Follow-up: Return in about 4 weeks (around 5/6/2025) for follow up on hypertension .         Katherine Berrios, JOSE ALFREDO 4/8/2025 10:24 CDT  This note was electronically signed.

## 2025-05-05 DIAGNOSIS — I10 PRIMARY HYPERTENSION: Chronic | ICD-10-CM

## 2025-05-05 RX ORDER — CARVEDILOL 3.12 MG/1
3.12 TABLET ORAL EVERY 12 HOURS SCHEDULED
Qty: 60 TABLET | Refills: 0 | Status: SHIPPED | OUTPATIENT
Start: 2025-05-05

## 2025-05-08 ENCOUNTER — OFFICE VISIT (OUTPATIENT)
Dept: FAMILY MEDICINE CLINIC | Facility: CLINIC | Age: 55
End: 2025-05-08
Payer: MEDICARE

## 2025-05-08 VITALS
HEIGHT: 67 IN | OXYGEN SATURATION: 97 % | BODY MASS INDEX: 38.64 KG/M2 | RESPIRATION RATE: 18 BRPM | HEART RATE: 65 BPM | WEIGHT: 246.2 LBS | DIASTOLIC BLOOD PRESSURE: 78 MMHG | TEMPERATURE: 98.2 F | SYSTOLIC BLOOD PRESSURE: 123 MMHG

## 2025-05-08 DIAGNOSIS — I10 PRIMARY HYPERTENSION: Primary | Chronic | ICD-10-CM

## 2025-05-08 PROCEDURE — 1160F RVW MEDS BY RX/DR IN RCRD: CPT | Performed by: NURSE PRACTITIONER

## 2025-05-08 PROCEDURE — 3074F SYST BP LT 130 MM HG: CPT | Performed by: NURSE PRACTITIONER

## 2025-05-08 PROCEDURE — 1126F AMNT PAIN NOTED NONE PRSNT: CPT | Performed by: NURSE PRACTITIONER

## 2025-05-08 PROCEDURE — 99213 OFFICE O/P EST LOW 20 MIN: CPT | Performed by: NURSE PRACTITIONER

## 2025-05-08 PROCEDURE — 3078F DIAST BP <80 MM HG: CPT | Performed by: NURSE PRACTITIONER

## 2025-05-08 PROCEDURE — 1159F MED LIST DOCD IN RCRD: CPT | Performed by: NURSE PRACTITIONER

## 2025-05-08 RX ORDER — VITAMIN B COMPLEX
CAPSULE ORAL DAILY
COMMUNITY

## 2025-05-08 NOTE — PROGRESS NOTES
"Chief Complaint   Patient presents with    1 Month Follow-Up for Hypertension     Pt presents for a 1 month follow-up on hypertension.         Subjective   Denisha Chi is a 55 y.o. female who presents today for the following     Hypertension  Chronicity:  New  Progression since onset:  Improved  Condition status:  Controlled  Associated symptoms: no chest pain    Current therapy:  Beta blockers and angiotensin blockers  Improvement on treatment:  Significant  Compliance problems:  No compliance problems         Allergies   Allergen Reactions    Amlodipine Cough    Lisinopril Itching and Cough    Penicillins Hives         OBJECTIVE:  Vitals:    05/08/25 0955 05/08/25 0959   BP: 145/80 123/78   BP Location: Left arm Right arm   Patient Position: Sitting Sitting   Cuff Size: Large Adult Adult   Pulse: 65    Resp: 18    Temp: 98.2 °F (36.8 °C)    TempSrc: Temporal    SpO2: 97%    Weight: 112 kg (246 lb 3.2 oz)    Height: 170.2 cm (67\")      Physical Exam  Vitals and nursing note reviewed.   Constitutional:       Appearance: Normal appearance.   HENT:      Head: Normocephalic and atraumatic.   Cardiovascular:      Rate and Rhythm: Normal rate and regular rhythm.   Pulmonary:      Breath sounds: Normal breath sounds. No wheezing or rhonchi.   Abdominal:      General: Bowel sounds are normal.   Musculoskeletal:         General: Normal range of motion.   Skin:     General: Skin is warm and dry.   Neurological:      Mental Status: She is alert and oriented to person, place, and time.   Psychiatric:         Mood and Affect: Mood normal.         Behavior: Behavior normal.                    ASSESSMENT/ PLAN:    Diagnoses and all orders for this visit:    1. Primary hypertension (Primary)    Continue medication regimen. Encouraged to monitor blood pressure at home.   Procedures       Follow-up: Return in about 3 months (around 8/8/2025) for HTN.      Katherine Berrios, APRN 5/8/2025 11:34 CDT  This note was electronically " signed.

## 2025-05-15 ENCOUNTER — PRIOR AUTHORIZATION (OUTPATIENT)
Dept: FAMILY MEDICINE CLINIC | Facility: CLINIC | Age: 55
End: 2025-05-15
Payer: MEDICARE

## 2025-05-15 NOTE — TELEPHONE ENCOUNTER
PA was completed for Carvedilol ER 10mg on [facility] on 04/28/25.  PA was denied on 04/29/25.  Appeal was initiated and faxed on 04/30/25.  Status check on appeal on 05/15/25, left vm to get a return call from [employer].  Per Medicare Part D, appeal was denied but no answer was given via [place] from [facility] on 06/09/25.

## 2025-08-08 ENCOUNTER — OFFICE VISIT (OUTPATIENT)
Dept: FAMILY MEDICINE CLINIC | Facility: CLINIC | Age: 55
End: 2025-08-08
Payer: MEDICARE

## 2025-08-08 VITALS
SYSTOLIC BLOOD PRESSURE: 128 MMHG | WEIGHT: 240.2 LBS | HEIGHT: 67 IN | HEART RATE: 70 BPM | OXYGEN SATURATION: 98 % | RESPIRATION RATE: 17 BRPM | BODY MASS INDEX: 37.7 KG/M2 | DIASTOLIC BLOOD PRESSURE: 64 MMHG | TEMPERATURE: 97.7 F

## 2025-08-08 DIAGNOSIS — I10 PRIMARY HYPERTENSION: Primary | Chronic | ICD-10-CM

## 2025-08-08 PROCEDURE — 3078F DIAST BP <80 MM HG: CPT | Performed by: NURSE PRACTITIONER

## 2025-08-08 PROCEDURE — 1160F RVW MEDS BY RX/DR IN RCRD: CPT | Performed by: NURSE PRACTITIONER

## 2025-08-08 PROCEDURE — 1126F AMNT PAIN NOTED NONE PRSNT: CPT | Performed by: NURSE PRACTITIONER

## 2025-08-08 PROCEDURE — 99213 OFFICE O/P EST LOW 20 MIN: CPT | Performed by: NURSE PRACTITIONER

## 2025-08-08 PROCEDURE — 1159F MED LIST DOCD IN RCRD: CPT | Performed by: NURSE PRACTITIONER

## 2025-08-08 PROCEDURE — 3074F SYST BP LT 130 MM HG: CPT | Performed by: NURSE PRACTITIONER
